# Patient Record
Sex: MALE | Race: WHITE
[De-identification: names, ages, dates, MRNs, and addresses within clinical notes are randomized per-mention and may not be internally consistent; named-entity substitution may affect disease eponyms.]

---

## 2017-07-06 ENCOUNTER — RX ONLY (RX ONLY)
Age: 82
End: 2017-07-06

## 2017-07-06 ENCOUNTER — DOCTOR'S OFFICE (OUTPATIENT)
Dept: URBAN - METROPOLITAN AREA CLINIC 136 | Facility: CLINIC | Age: 82
Setting detail: OPHTHALMOLOGY
End: 2017-07-06
Payer: COMMERCIAL

## 2017-07-06 DIAGNOSIS — H02.831: ICD-10-CM

## 2017-07-06 DIAGNOSIS — H43.811: ICD-10-CM

## 2017-07-06 DIAGNOSIS — D31.31: ICD-10-CM

## 2017-07-06 DIAGNOSIS — E11.9: ICD-10-CM

## 2017-07-06 DIAGNOSIS — H02.834: ICD-10-CM

## 2017-07-06 DIAGNOSIS — Z79.899: ICD-10-CM

## 2017-07-06 PROCEDURE — 99204 OFFICE O/P NEW MOD 45 MIN: CPT | Performed by: OPHTHALMOLOGY

## 2017-07-06 ASSESSMENT — LID POSITION - DERMATOCHALASIS
OS_DERMATOCHALASIS: 1+
OD_DERMATOCHALASIS: 1+

## 2017-07-06 ASSESSMENT — REFRACTION_MANIFEST
OS_VA2: 20/
OS_VA3: 20/
OS_VA1: 20/
OS_VA2: 20/
OD_VA3: 20/
OS_VA2: 20/
OU_VA: 20/
OS_VA1: 20/
OS_VA3: 20/
OD_VA2: 20/
OS_VA1: 20/
OD_VA1: 20/
OD_VA3: 20/
OU_VA: 20/
OD_VA1: 20/
OU_VA: 20/
OD_VA2: 20/
OS_VA3: 20/
OD_VA3: 20/
OD_VA2: 20/
OD_VA1: 20/

## 2017-07-06 ASSESSMENT — REFRACTION_CURRENTRX
OS_OVR_VA: 20/
OD_OVR_VA: 20/

## 2017-07-06 ASSESSMENT — CONFRONTATIONAL VISUAL FIELD TEST (CVF)
OD_FINDINGS: FULL
OS_FINDINGS: FULL

## 2017-07-06 ASSESSMENT — VISUAL ACUITY
OD_BCVA: 20/25-2
OS_BCVA: 20/40+2

## 2017-12-20 ENCOUNTER — ALLSCRIPTS OFFICE VISIT (OUTPATIENT)
Dept: OTHER | Facility: OTHER | Age: 82
End: 2017-12-20

## 2017-12-20 LAB
BILIRUB UR QL STRIP: NORMAL
CLARITY UR: NORMAL
COLOR UR: YELLOW
GLUCOSE (HISTORICAL): NORMAL
HGB UR QL STRIP.AUTO: NORMAL
KETONES UR STRIP-MCNC: NORMAL MG/DL
LEUKOCYTE ESTERASE UR QL STRIP: NORMAL
NITRITE UR QL STRIP: NORMAL
PH UR STRIP.AUTO: 6 [PH]
PROT UR STRIP-MCNC: NORMAL MG/DL
SP GR UR STRIP.AUTO: 1.01
UROBILINOGEN UR QL STRIP.AUTO: 0.2

## 2018-01-23 VITALS
HEIGHT: 70 IN | BODY MASS INDEX: 26.48 KG/M2 | SYSTOLIC BLOOD PRESSURE: 120 MMHG | WEIGHT: 185 LBS | DIASTOLIC BLOOD PRESSURE: 78 MMHG

## 2018-02-12 DIAGNOSIS — N32.81 OVERACTIVE BLADDER: Primary | ICD-10-CM

## 2018-02-12 RX ORDER — OXYBUTYNIN CHLORIDE 5 MG/1
TABLET ORAL
Qty: 180 TABLET | Refills: 3 | Status: SHIPPED | OUTPATIENT
Start: 2018-02-12

## 2020-05-03 ENCOUNTER — OFFICE VISIT (OUTPATIENT)
Dept: URGENT CARE | Age: 85
End: 2020-05-03
Payer: COMMERCIAL

## 2020-05-03 ENCOUNTER — APPOINTMENT (OUTPATIENT)
Dept: RADIOLOGY | Age: 85
End: 2020-05-03
Payer: COMMERCIAL

## 2020-05-03 VITALS
DIASTOLIC BLOOD PRESSURE: 69 MMHG | HEIGHT: 72 IN | WEIGHT: 180 LBS | OXYGEN SATURATION: 95 % | BODY MASS INDEX: 24.38 KG/M2 | TEMPERATURE: 98.2 F | HEART RATE: 74 BPM | SYSTOLIC BLOOD PRESSURE: 130 MMHG | RESPIRATION RATE: 18 BRPM

## 2020-05-03 DIAGNOSIS — M79.672 LEFT FOOT PAIN: ICD-10-CM

## 2020-05-03 DIAGNOSIS — S90.32XA CONTUSION OF LEFT FOOT, INITIAL ENCOUNTER: Primary | ICD-10-CM

## 2020-05-03 PROCEDURE — 73630 X-RAY EXAM OF FOOT: CPT

## 2020-05-03 PROCEDURE — 99203 OFFICE O/P NEW LOW 30 MIN: CPT | Performed by: PHYSICIAN ASSISTANT

## 2020-05-03 RX ORDER — AMLODIPINE BESYLATE 10 MG/1
TABLET ORAL
COMMUNITY
Start: 2020-01-15

## 2020-05-03 RX ORDER — SIMVASTATIN 20 MG
TABLET ORAL
COMMUNITY
Start: 2020-04-02

## 2020-05-03 RX ORDER — CHOLECALCIFEROL (VITAMIN D3) 125 MCG
1 CAPSULE ORAL
COMMUNITY

## 2020-05-03 RX ORDER — DOXYCYCLINE 100 MG/1
100 TABLET ORAL 2 TIMES DAILY
Qty: 14 TABLET | Refills: 0 | Status: SHIPPED | OUTPATIENT
Start: 2020-05-03 | End: 2020-05-10

## 2020-05-03 RX ORDER — HYDROXYCHLOROQUINE SULFATE 200 MG/1
200 TABLET, FILM COATED ORAL 2 TIMES DAILY
COMMUNITY
Start: 2014-11-04

## 2020-05-03 RX ORDER — OMEGA-3-ACID ETHYL ESTERS 1 G/1
CAPSULE, LIQUID FILLED ORAL
COMMUNITY

## 2020-05-03 RX ORDER — LISINOPRIL 40 MG/1
1 TABLET ORAL DAILY
COMMUNITY
Start: 2018-07-24

## 2020-05-03 RX ORDER — REPAGLINIDE 0.5 MG/1
TABLET ORAL
COMMUNITY
Start: 2020-04-02

## 2020-09-02 ENCOUNTER — OFFICE VISIT (OUTPATIENT)
Dept: URGENT CARE | Age: 85
End: 2020-09-02
Payer: COMMERCIAL

## 2020-09-02 VITALS
OXYGEN SATURATION: 96 % | RESPIRATION RATE: 18 BRPM | HEIGHT: 72 IN | HEART RATE: 69 BPM | SYSTOLIC BLOOD PRESSURE: 127 MMHG | WEIGHT: 180 LBS | TEMPERATURE: 98 F | BODY MASS INDEX: 24.38 KG/M2 | DIASTOLIC BLOOD PRESSURE: 60 MMHG

## 2020-09-02 DIAGNOSIS — T63.451A HORNET STING, ACCIDENTAL OR UNINTENTIONAL, INITIAL ENCOUNTER: Primary | ICD-10-CM

## 2020-09-02 PROCEDURE — 99213 OFFICE O/P EST LOW 20 MIN: CPT | Performed by: PHYSICIAN ASSISTANT

## 2020-09-02 NOTE — PROGRESS NOTES
[facility] [facility]        NAME: Candance Gone is a 81 y o  male  : 1935    MRN: [id number]  DATE: 2020  TIME: 4:16 PM    Assessment and Plan   Hornet sting, accidental or unintentional, initial encounter [T63 [id number]]  1  Hornet sting, accidental or unintentional, initial encounter           Patient Instructions     Follow up with PCP in 3-5 days  Proceed to  ER if symptoms worsen  Chief Complaint     Chief Complaint   Patient presents with    Hand Pain     Pt states that he got stung by a hornet on the left pinky finger, pt states that the pain is starting to radiate up the hand          History of Present Illness       20-year-old male presents for a hornet sting to his left 5th finger  He states that it happened approximately 3 hours ago  He is concerned because his finger is swollen and painful  He denies any itching, rash, shortness of breath, difficulty breathing, lip or tongue swelling  Patient states he has no previous history of any allergic reactions or anaphylaxis  Review of Systems   Review of Systems   Constitutional: Negative  Respiratory: Negative  Cardiovascular: Negative  Skin: Positive for color change  Hornet sting     Neurological: Negative            Current Medications       Current Outpatient Medications:     amLODIPine (NORVASC) 10 mg tablet, TAKE 1 TABLET BY MOUTH ONCE A DAY, Disp: , Rfl:     Cholecalciferol (VITAMIN D3) 50 MCG (2000 UT) TABS, 1 capsule, Disp: , Rfl:     hydroxychloroquine (PLAQUENIL) 200 mg tablet, Take 200 mg by mouth 2 (two) times a day, Disp: , Rfl:     lisinopril (ZESTRIL) 40 mg tablet, Take 1 tablet by mouth daily, Disp: , Rfl:     metFORMIN (GLUCOPHAGE) 1000 MG tablet, Take 1,000 mg by mouth 2 (two) times a day, Disp: , Rfl:     Multiple Vitamins-Minerals (CENTRUM SILVER 50+MEN PO), Take by mouth, Disp: , Rfl:     omega-3-acid ethyl esters (Lovaza) 1 g capsule, , Disp: , Rfl:     oxybutynin (DITROPAN) 5 mg tablet, TAKE 1 TABLET BY MOUTH  TWICE A DAY, Disp: 180 tablet, Rfl: 3    repaglinide (PRANDIN) 0 5 mg tablet, , Disp: , Rfl:     simvastatin (ZOCOR) 20 mg tablet, , Disp: , Rfl:     Current Allergies     Allergies as of 09/02/2020 - Reviewed 09/02/2020   Allergen Reaction Noted    Penicillins Rash 07/13/2017            The following portions of the patient's history were reviewed and updated as appropriate: allergies, current medications, past family history, past medical history, past social history, past surgical history and problem list     Objective   /60   Pulse 69   Temp 98 °F (36 7 °C)   Resp 18   Ht 6' (1 829 m)   Wt 81 6 kg (180 lb)   SpO2 96%   BMI 24 41 kg/m²        Physical Exam     Physical Exam  Vitals signs and nursing note reviewed  Constitutional:       General: He is not in acute distress  Appearance: He is not ill-appearing  Cardiovascular:      Rate and Rhythm: Normal rate and regular rhythm  Pulmonary:      Effort: Pulmonary effort is normal       Breath sounds: Normal breath sounds  Skin:         Neurological:      Mental Status: He is alert and oriented to person, place, and time

## 2020-09-02 NOTE — PATIENT INSTRUCTIONS
Hornet sting with localized reaction  Take antihistamine such as Claritin  Elevate and ice  Any worsening of symptoms such as rash, shortness of breath, lip her tongue swelling to the emergency room

## 2022-07-25 PROCEDURE — U0005 INFEC AGEN DETEC AMPLI PROBE: HCPCS | Performed by: FAMILY MEDICINE

## 2022-07-25 PROCEDURE — U0003 INFECTIOUS AGENT DETECTION BY NUCLEIC ACID (DNA OR RNA); SEVERE ACUTE RESPIRATORY SYNDROME CORONAVIRUS 2 (SARS-COV-2) (CORONAVIRUS DISEASE [COVID-19]), AMPLIFIED PROBE TECHNIQUE, MAKING USE OF HIGH THROUGHPUT TECHNOLOGIES AS DESCRIBED BY CMS-2020-01-R: HCPCS | Performed by: FAMILY MEDICINE

## 2022-07-26 ENCOUNTER — LAB REQUISITION (OUTPATIENT)
Dept: LAB | Facility: HOSPITAL | Age: 87
End: 2022-07-26
Payer: COMMERCIAL

## 2022-07-26 DIAGNOSIS — Z11.59 ENCOUNTER FOR SCREENING FOR OTHER VIRAL DISEASES: ICD-10-CM

## 2022-07-26 DIAGNOSIS — Z03.818 ENCOUNTER FOR OBSERVATION FOR SUSPECTED EXPOSURE TO OTHER BIOLOGICAL AGENTS RULED OUT: ICD-10-CM

## 2022-07-26 LAB — SARS-COV-2 RNA RESP QL NAA+PROBE: NEGATIVE

## 2022-07-29 PROCEDURE — U0005 INFEC AGEN DETEC AMPLI PROBE: HCPCS | Performed by: FAMILY MEDICINE

## 2022-07-29 PROCEDURE — U0003 INFECTIOUS AGENT DETECTION BY NUCLEIC ACID (DNA OR RNA); SEVERE ACUTE RESPIRATORY SYNDROME CORONAVIRUS 2 (SARS-COV-2) (CORONAVIRUS DISEASE [COVID-19]), AMPLIFIED PROBE TECHNIQUE, MAKING USE OF HIGH THROUGHPUT TECHNOLOGIES AS DESCRIBED BY CMS-2020-01-R: HCPCS | Performed by: FAMILY MEDICINE

## 2022-07-30 ENCOUNTER — LAB REQUISITION (OUTPATIENT)
Dept: LAB | Facility: HOSPITAL | Age: 87
End: 2022-07-30
Payer: COMMERCIAL

## 2022-07-30 DIAGNOSIS — Z11.59 ENCOUNTER FOR SCREENING FOR OTHER VIRAL DISEASES: ICD-10-CM

## 2022-07-30 DIAGNOSIS — Z03.818 ENCOUNTER FOR OBSERVATION FOR SUSPECTED EXPOSURE TO OTHER BIOLOGICAL AGENTS RULED OUT: ICD-10-CM

## 2022-07-30 LAB — SARS-COV-2 RNA RESP QL NAA+PROBE: NEGATIVE

## 2022-08-02 PROCEDURE — U0003 INFECTIOUS AGENT DETECTION BY NUCLEIC ACID (DNA OR RNA); SEVERE ACUTE RESPIRATORY SYNDROME CORONAVIRUS 2 (SARS-COV-2) (CORONAVIRUS DISEASE [COVID-19]), AMPLIFIED PROBE TECHNIQUE, MAKING USE OF HIGH THROUGHPUT TECHNOLOGIES AS DESCRIBED BY CMS-2020-01-R: HCPCS | Performed by: FAMILY MEDICINE

## 2022-08-02 PROCEDURE — U0005 INFEC AGEN DETEC AMPLI PROBE: HCPCS | Performed by: FAMILY MEDICINE

## 2022-08-04 ENCOUNTER — LAB REQUISITION (OUTPATIENT)
Dept: LAB | Facility: HOSPITAL | Age: 87
End: 2022-08-04
Payer: COMMERCIAL

## 2022-08-04 DIAGNOSIS — Z11.59 ENCOUNTER FOR SCREENING FOR OTHER VIRAL DISEASES: ICD-10-CM

## 2022-08-04 DIAGNOSIS — Z03.818 ENCOUNTER FOR OBSERVATION FOR SUSPECTED EXPOSURE TO OTHER BIOLOGICAL AGENTS RULED OUT: ICD-10-CM

## 2022-08-04 LAB — SARS-COV-2 RNA RESP QL NAA+PROBE: NEGATIVE

## 2022-08-12 PROCEDURE — U0003 INFECTIOUS AGENT DETECTION BY NUCLEIC ACID (DNA OR RNA); SEVERE ACUTE RESPIRATORY SYNDROME CORONAVIRUS 2 (SARS-COV-2) (CORONAVIRUS DISEASE [COVID-19]), AMPLIFIED PROBE TECHNIQUE, MAKING USE OF HIGH THROUGHPUT TECHNOLOGIES AS DESCRIBED BY CMS-2020-01-R: HCPCS | Performed by: FAMILY MEDICINE

## 2022-08-12 PROCEDURE — U0005 INFEC AGEN DETEC AMPLI PROBE: HCPCS | Performed by: FAMILY MEDICINE

## 2022-08-13 ENCOUNTER — LAB REQUISITION (OUTPATIENT)
Dept: LAB | Facility: HOSPITAL | Age: 87
End: 2022-08-13
Payer: COMMERCIAL

## 2022-08-13 DIAGNOSIS — Z11.59 ENCOUNTER FOR SCREENING FOR OTHER VIRAL DISEASES: ICD-10-CM

## 2022-08-13 LAB — SARS-COV-2 RNA RESP QL NAA+PROBE: NEGATIVE

## 2023-01-19 ENCOUNTER — OFFICE VISIT (OUTPATIENT)
Dept: INTERNAL MEDICINE CLINIC | Facility: OTHER | Age: 88
End: 2023-01-19

## 2023-01-19 VITALS
WEIGHT: 190 LBS | TEMPERATURE: 98.2 F | BODY MASS INDEX: 26.6 KG/M2 | HEIGHT: 71 IN | SYSTOLIC BLOOD PRESSURE: 130 MMHG | DIASTOLIC BLOOD PRESSURE: 72 MMHG | OXYGEN SATURATION: 98 % | HEART RATE: 68 BPM

## 2023-01-19 DIAGNOSIS — L50.9 HIVES: Primary | ICD-10-CM

## 2023-01-19 RX ORDER — PREDNISONE 20 MG/1
40 TABLET ORAL DAILY
Qty: 10 TABLET | Refills: 0 | Status: SHIPPED | OUTPATIENT
Start: 2023-01-19 | End: 2023-01-24

## 2023-01-19 RX ORDER — APIXABAN 2.5 MG/1
TABLET, FILM COATED ORAL
COMMUNITY
Start: 2022-12-08

## 2023-01-19 RX ORDER — PREDNISONE 1 MG/1
1 TABLET ORAL DAILY
COMMUNITY
Start: 2022-10-18 | End: 2023-01-19

## 2023-01-19 RX ORDER — EMPAGLIFLOZIN 10 MG/1
TABLET, FILM COATED ORAL
COMMUNITY
Start: 2023-01-11

## 2023-01-19 RX ORDER — LANCETS
EACH MISCELLANEOUS
COMMUNITY
Start: 2023-01-11

## 2023-01-19 RX ORDER — CHLORAL HYDRATE 500 MG
CAPSULE ORAL
COMMUNITY

## 2023-01-19 RX ORDER — MIRTAZAPINE 15 MG/1
TABLET, FILM COATED ORAL
COMMUNITY
Start: 2022-12-08

## 2023-01-19 RX ORDER — BLOOD SUGAR DIAGNOSTIC
STRIP MISCELLANEOUS
COMMUNITY
Start: 2023-01-11

## 2023-01-19 RX ORDER — MIRABEGRON 50 MG/1
50 TABLET, FILM COATED, EXTENDED RELEASE ORAL DAILY
COMMUNITY
Start: 2023-01-11

## 2023-01-19 RX ORDER — NAPROXEN SODIUM 220 MG
TABLET ORAL
COMMUNITY

## 2023-01-19 NOTE — PROGRESS NOTES
Assessment/Plan:    Hives  Will prescribe prednisone 40 mg daily for 5 days  He is to contact our office for persistent or worsening symptoms  Diagnoses and all orders for this visit:    Hives  -     predniSONE 20 mg tablet; Take 2 tablets (40 mg total) by mouth daily for 5 days    Other orders  -     Multiple Vitamins-Minerals (CENTRUM SILVER 50+MEN PO)  -     Eliquis 2 5 MG  -     Jardiance 10 MG TABS tablet  -     Accu-Chek Lois Plus test strip  -     Accu-Chek FastClix Lancets MISC  -     metoprolol tartrate (LOPRESSOR) 25 mg tablet  -     Mirabegron ER (Myrbetriq) 50 MG TB24; Take 50 mg by mouth daily  -     mirtazapine (REMERON) 15 mg tablet  -     Omega-3 Fatty Acids (fish oil) 1,000 mg  -     naproxen sodium (ALEVE) 220 MG tablet  -     Discontinue: predniSONE 5 mg tablet; Take 1 tablet by mouth daily                Subjective:      Patient ID: Jordan Rowe is a 81 y o  male  Chief Complaint   Patient presents with   • Rash     Rash All over body Tuesday he didn't feel good he drank ginger ale made him feel sick,        56-year-old male seen today with concern for diffuse rash  The rash started 2 days ago  Around the same time he developed an upset stomach however denies any nausea, vomiting, diarrhea  He denies any new medications, detergents, or any ingestion of suspicious foods  He has been drinking ginger ale for his upset stomach however denies taking any over-the-counter medications  Rash  This is a new problem  The current episode started in the past 7 days  The problem is unchanged  The rash is diffuse  Pertinent negatives include no congestion, cough, diarrhea, fatigue, fever, rhinorrhea, shortness of breath, sore throat or vomiting         The following portions of the patient's history were reviewed and updated as appropriate: allergies, current medications, past family history, past medical history, past social history, past surgical history and problem list     Review of For cholesterol - start Rosuvastatin 5mg daily. Return to clinic in 3 months - have fasting blood work/urine done 3-4 days prior to visit. Systems   Constitutional: Negative for activity change, appetite change, chills, diaphoresis, fatigue and fever  HENT: Negative for congestion, postnasal drip, rhinorrhea, sinus pressure, sinus pain, sneezing and sore throat  Eyes: Negative for visual disturbance  Respiratory: Negative for apnea, cough, choking, chest tightness, shortness of breath and wheezing  Cardiovascular: Negative for chest pain, palpitations and leg swelling  Gastrointestinal: Negative for abdominal distention, abdominal pain, anal bleeding, blood in stool, constipation, diarrhea, nausea and vomiting  Endocrine: Negative for cold intolerance and heat intolerance  Genitourinary: Negative for difficulty urinating, dysuria and hematuria  Musculoskeletal: Negative  Skin: Positive for rash  Neurological: Negative for dizziness, weakness, light-headedness, numbness and headaches  Hematological: Negative for adenopathy  Psychiatric/Behavioral: Negative for agitation, sleep disturbance and suicidal ideas  All other systems reviewed and are negative          Past Medical History:   Diagnosis Date   • Chronic kidney disease, stage 3 (HCC)     with secondary hyperparathyroidism of renal origin   • Diabetes mellitus ([facility] )    • Hyperlipidemia    • Hypertension    • Rheumatoid arthritis ([facility] )          Current Outpatient Medications:   •  Accu-Chek Lois Plus test strip, , Disp: , Rfl:   •  Accu-Chek FastClix Lancets MISC, , Disp: , Rfl:   •  amLODIPine (NORVASC) 10 mg tablet, TAKE 1 TABLET BY MOUTH ONCE A DAY, Disp: , Rfl:   •  Cholecalciferol (VITAMIN D3) 50 MCG (2000 UT) TABS, 1 capsule, Disp: , Rfl:   •  Eliquis 2 5 MG, , Disp: , Rfl:   •  hydroxychloroquine (PLAQUENIL) 200 mg tablet, Take 200 mg by mouth 2 (two) times a day, Disp: , Rfl:   •  Jardiance 10 MG TABS tablet, , Disp: , Rfl:   •  lisinopril (ZESTRIL) 40 mg tablet, Take 1 tablet by mouth daily, Disp: , Rfl:   •  metFORMIN (GLUCOPHAGE) 1000 MG tablet, Take 1,000 mg by mouth 2 (two) times a day, Disp: , Rfl:   •  metoprolol tartrate (LOPRESSOR) 25 mg tablet, , Disp: , Rfl:   •  Mirabegron ER (Myrbetriq) 50 MG TB24, Take 50 mg by mouth daily, Disp: , Rfl:   •  mirtazapine (REMERON) 15 mg tablet, , Disp: , Rfl:   •  Multiple Vitamins-Minerals (CENTRUM SILVER 50+MEN PO), , Disp: , Rfl:   •  naproxen sodium (ALEVE) 220 MG tablet, , Disp: , Rfl:   •  Omega-3 Fatty Acids (fish oil) 1,000 mg, , Disp: , Rfl:   •  omega-3-acid ethyl esters (LOVAZA) 1 g capsule, , Disp: , Rfl:   •  oxybutynin (DITROPAN) 5 mg tablet, TAKE 1 TABLET BY MOUTH  TWICE A DAY, Disp: 180 tablet, Rfl: 3  •  predniSONE 20 mg tablet, Take 2 tablets (40 mg total) by mouth daily for 5 days, Disp: 10 tablet, Rfl: 0  •  repaglinide (PRANDIN) 0 5 mg tablet, , Disp: , Rfl:   •  simvastatin (ZOCOR) 20 mg tablet, , Disp: , Rfl:     Allergies   Allergen Reactions   • Penicillins Rash       Social History   History reviewed  No pertinent surgical history  History reviewed  No pertinent family history  Objective:  /72 (BP Location: Left arm, Patient Position: Sitting, Cuff Size: Large)   Pulse 68   Temp 98 2 °F (36 8 °C) (Temporal)   Ht 5' 11" (1 803 m)   Wt 86 2 kg (190 lb)   SpO2 98%   BMI 26 50 kg/m²     No results found for this or any previous visit (from the past 1344 hour(s))  Physical Exam  Vitals and nursing note reviewed  Constitutional:       General: He is not in acute distress  Appearance: He is well-developed  He is not diaphoretic  HENT:      Head: Normocephalic and atraumatic  Eyes:      General: No scleral icterus  Right eye: No discharge  Left eye: No discharge  Conjunctiva/sclera: Conjunctivae normal       Pupils: Pupils are equal, round, and reactive to light  Neck:      Thyroid: No thyromegaly  Vascular: No JVD  Cardiovascular:      Rate and Rhythm: Normal rate and regular rhythm  Heart sounds: Normal heart sounds  No murmur heard      No friction rub  No gallop  Pulmonary:      Effort: Pulmonary effort is normal  No respiratory distress  Breath sounds: Normal breath sounds  No wheezing or rales  Chest:      Chest wall: No tenderness  Abdominal:      General: Bowel sounds are normal  There is no distension  Palpations: Abdomen is soft  There is no mass  Tenderness: There is no abdominal tenderness  There is no guarding or rebound  Musculoskeletal:         General: No tenderness or deformity  Normal range of motion  Cervical back: Normal range of motion and neck supple  Lymphadenopathy:      Cervical: No cervical adenopathy  Skin:     General: Skin is warm and dry  Coloration: Skin is not pale  Findings: No erythema or rash  Comments: Diffuse hives  Neurological:      Mental Status: He is alert and oriented to person, place, and time  Cranial Nerves: No cranial nerve deficit  Coordination: Coordination normal       Deep Tendon Reflexes: Reflexes are normal and symmetric  Psychiatric:         Behavior: Behavior normal          Thought Content:  Thought content normal          Judgment: Judgment normal

## 2023-01-19 NOTE — ASSESSMENT & PLAN NOTE
Will prescribe prednisone 40 mg daily for 5 days  He is to contact our office for persistent or worsening symptoms

## 2023-02-08 ENCOUNTER — RA CDI HCC (OUTPATIENT)
Dept: OTHER | Facility: HOSPITAL | Age: 88
End: 2023-02-08

## 2023-02-08 ENCOUNTER — OFFICE VISIT (OUTPATIENT)
Dept: INTERNAL MEDICINE CLINIC | Facility: OTHER | Age: 88
End: 2023-02-08

## 2023-02-08 VITALS
WEIGHT: 190.2 LBS | HEART RATE: 65 BPM | OXYGEN SATURATION: 96 % | HEIGHT: 71 IN | RESPIRATION RATE: 20 BRPM | TEMPERATURE: 98 F | BODY MASS INDEX: 26.63 KG/M2 | DIASTOLIC BLOOD PRESSURE: 66 MMHG | SYSTOLIC BLOOD PRESSURE: 118 MMHG

## 2023-02-08 DIAGNOSIS — I12.9 HYPERTENSION ASSOCIATED WITH STAGE 3 CHRONIC KIDNEY DISEASE DUE TO TYPE 2 DIABETES MELLITUS (HCC): ICD-10-CM

## 2023-02-08 DIAGNOSIS — M06.9 RHEUMATOID ARTHRITIS INVOLVING MULTIPLE SITES, UNSPECIFIED WHETHER RHEUMATOID FACTOR PRESENT (HCC): ICD-10-CM

## 2023-02-08 DIAGNOSIS — R97.20 HIGH PROSTATE SPECIFIC ANTIGEN (PSA): ICD-10-CM

## 2023-02-08 DIAGNOSIS — I48.91 ATRIAL FIBRILLATION, UNSPECIFIED TYPE (HCC): ICD-10-CM

## 2023-02-08 DIAGNOSIS — R80.9 PROTEINURIA, UNSPECIFIED TYPE: ICD-10-CM

## 2023-02-08 DIAGNOSIS — N25.81 SECONDARY HYPERPARATHYROIDISM OF RENAL ORIGIN (HCC): ICD-10-CM

## 2023-02-08 DIAGNOSIS — E78.2 MIXED HYPERLIPIDEMIA: ICD-10-CM

## 2023-02-08 DIAGNOSIS — N18.30 HYPERTENSION ASSOCIATED WITH STAGE 3 CHRONIC KIDNEY DISEASE DUE TO TYPE 2 DIABETES MELLITUS (HCC): ICD-10-CM

## 2023-02-08 DIAGNOSIS — E11.22 HYPERTENSION ASSOCIATED WITH STAGE 3 CHRONIC KIDNEY DISEASE DUE TO TYPE 2 DIABETES MELLITUS (HCC): ICD-10-CM

## 2023-02-08 DIAGNOSIS — L50.9 HIVES: Primary | ICD-10-CM

## 2023-02-08 DIAGNOSIS — I10 ESSENTIAL HYPERTENSION: ICD-10-CM

## 2023-02-08 DIAGNOSIS — E11.65 TYPE 2 DIABETES MELLITUS WITH HYPERGLYCEMIA, WITHOUT LONG-TERM CURRENT USE OF INSULIN (HCC): ICD-10-CM

## 2023-02-08 PROBLEM — N32.81 OVERACTIVE BLADDER: Status: ACTIVE | Noted: 2017-07-13

## 2023-02-08 PROBLEM — R69 OTHER ILL-DEFINED AND UNKNOWN CAUSES OF MORBIDITY AND MORTALITY: Status: ACTIVE | Noted: 2023-02-08

## 2023-02-08 PROBLEM — I45.4 BUNDLE BRANCH BLOCK: Status: ACTIVE | Noted: 2018-04-05

## 2023-02-08 PROBLEM — E79.0 HYPERURICEMIA: Status: ACTIVE | Noted: 2019-09-06

## 2023-02-08 PROBLEM — M85.89 OSTEOPENIA OF MULTIPLE SITES: Status: ACTIVE | Noted: 2020-10-20

## 2023-02-08 PROBLEM — M19.90 OSTEOARTHRITIS: Status: ACTIVE | Noted: 2023-02-08

## 2023-02-08 PROBLEM — F52.8 PSYCHOSEXUAL DYSFUNCTION WITH INHIBITED SEXUAL EXCITEMENT: Status: ACTIVE | Noted: 2023-02-08

## 2023-02-08 PROBLEM — H25.019 CORTICAL SENILE CATARACT: Status: ACTIVE | Noted: 2023-02-08

## 2023-02-08 PROBLEM — F41.1 GENERALIZED ANXIETY DISORDER: Status: ACTIVE | Noted: 2023-02-08

## 2023-02-08 NOTE — PROGRESS NOTES
[facility]  coding opportunities       Chart reviewed, no opportunity found:   [address]        Patients Insurance     Medicare Insurance: [employer]

## 2023-02-08 NOTE — PROGRESS NOTES
Assessment/Plan:    Type 2 diabetes mellitus (HCC)  Continue current regimen, recheck A1c  Lab Results   Component Value Date    HGBA1C 9 7 (H) 05/11/2022       Secondary hyperparathyroidism of renal origin ([facility] 75 )  Will check PTH and kidney function  Essential hypertension  Controlled, Continue current antihypertensive regimen  Hives  Discontinue mirabegron  Follow up in 2 weeks  Diagnoses and all orders for this visit:    Hives  -     C-reactive protein; Future    Hypertension associated with stage 3 chronic kidney disease due to type 2 diabetes mellitus (HCC)  -     CBC; Future  -     Comprehensive metabolic panel; Future  -     Hemoglobin A1C; Future  -     Lipid panel; Future  -     TSH, 3rd generation with Free T4 reflex; Future  -     Microalbumin / creatinine urine ratio; Future  -     UA w Reflex to Microscopic w Reflex to Culture -Lab Collect; Future    Type 2 diabetes mellitus with hyperglycemia, without long-term current use of insulin ([facility])  -     CBC; Future  -     Comprehensive metabolic panel; Future  -     Hemoglobin A1C; Future  -     Lipid panel; Future  -     Microalbumin / creatinine urine ratio; Future  -     UA w Reflex to Microscopic w Reflex to Culture -Lab Collect; Future    Rheumatoid arthritis involving multiple sites, unspecified whether rheumatoid factor present ([facility])  -     C-reactive protein; Future    High prostate specific antigen (PSA)  -     PSA Total, Diagnostic; Future    Mixed hyperlipidemia  -     CBC; Future  -     Comprehensive metabolic panel; Future  -     Lipid panel; Future    Proteinuria, unspecified type  -     CBC; Future  -     Comprehensive metabolic panel; Future  -     Microalbumin / creatinine urine ratio; Future  -     UA w Reflex to Microscopic w Reflex to Culture -Lab Collect; Future    Secondary hyperparathyroidism of renal origin ([facility] 75 )  -     CBC; Future  -     Comprehensive metabolic panel; Future  -     PTH, intact;  Future    Atrial fibrillation, unspecified type [facility])    Essential hypertension                Subjective:      Patient ID: Senthil Herndon is a 81 y o  male  Chief Complaint   Patient presents with   • Rash     Continued rash finished with the prednisone  Has it on his arms trunk back spreading now to his groin  Itches  Was seen on 1/19/23    • hm     AWV, fall risk, bmi f/u plan, phq       81year-old male seen today with concern for persistent rash  He was prescribed prednisone which he did not notice any improvement of the rash  The rash is pruritic at times  The rash remains elevated to proximal lower extremities, chest, abdomen, back, and bilateral upper arms  In revieweding his medications again, he admits to recently starting Mirabegron  Rash  This is a new problem  The current episode started more than 1 month ago  The problem is unchanged  The rash is diffuse  Pertinent negatives include no congestion, cough, diarrhea, fatigue, fever, rhinorrhea, shortness of breath, sore throat or vomiting  The following portions of the patient's history were reviewed and updated as appropriate: allergies, current medications, past family history, past medical history, past social history, past surgical history and problem list     Review of Systems   Constitutional: Negative for activity change, appetite change, chills, diaphoresis, fatigue and fever  HENT: Negative for congestion, postnasal drip, rhinorrhea, sinus pressure, sinus pain, sneezing and sore throat  Eyes: Negative for visual disturbance  Respiratory: Negative for apnea, cough, choking, chest tightness, shortness of breath and wheezing  Cardiovascular: Negative for chest pain, palpitations and leg swelling  Gastrointestinal: Negative for abdominal distention, abdominal pain, anal bleeding, blood in stool, constipation, diarrhea, nausea and vomiting  Endocrine: Negative for cold intolerance and heat intolerance     Genitourinary: Negative for difficulty urinating, dysuria and hematuria  Musculoskeletal: Negative  Skin: Positive for rash  Neurological: Negative for dizziness, weakness, light-headedness, numbness and headaches  Hematological: Negative for adenopathy  Psychiatric/Behavioral: Negative for agitation, sleep disturbance and suicidal ideas  All other systems reviewed and are negative          Past Medical History:   Diagnosis Date   • Chronic kidney disease, stage 3 (HCC)     with secondary hyperparathyroidism of renal origin   • Diabetes mellitus ([facility] )    • Hyperlipidemia    • Hypertension    • Rheumatoid arthritis ([facility] )          Current Outpatient Medications:   •  Accu-Chek Lois Plus test strip, , Disp: , Rfl:   •  Accu-Chek FastClix Lancets MISC, , Disp: , Rfl:   •  amLODIPine (NORVASC) 10 mg tablet, TAKE 1 TABLET BY MOUTH ONCE A DAY, Disp: , Rfl:   •  Cholecalciferol (VITAMIN D3) 50 MCG (2000 UT) TABS, 1 capsule, Disp: , Rfl:   •  Eliquis 2 5 MG, , Disp: , Rfl:   •  hydroxychloroquine (PLAQUENIL) 200 mg tablet, Take 200 mg by mouth 2 (two) times a day, Disp: , Rfl:   •  Jardiance 10 MG TABS tablet, , Disp: , Rfl:   •  lisinopril (ZESTRIL) 40 mg tablet, Take 1 tablet by mouth daily, Disp: , Rfl:   •  metFORMIN (GLUCOPHAGE) 1000 MG tablet, Take 1,000 mg by mouth 2 (two) times a day, Disp: , Rfl:   •  metoprolol tartrate (LOPRESSOR) 25 mg tablet, , Disp: , Rfl:   •  mirtazapine (REMERON) 15 mg tablet, , Disp: , Rfl:   •  Multiple Vitamins-Minerals (CENTRUM SILVER 50+MEN PO), , Disp: , Rfl:   •  naproxen sodium (ALEVE) 220 MG tablet, , Disp: , Rfl:   •  Omega-3 Fatty Acids (fish oil) 1,000 mg, , Disp: , Rfl:   •  omega-3-acid ethyl esters (LOVAZA) 1 g capsule, , Disp: , Rfl:   •  oxybutynin (DITROPAN) 5 mg tablet, TAKE 1 TABLET BY MOUTH  TWICE A DAY, Disp: 180 tablet, Rfl: 3  •  repaglinide (PRANDIN) 0 5 mg tablet, , Disp: , Rfl:   •  simvastatin (ZOCOR) 20 mg tablet, , Disp: , Rfl:     Allergies   Allergen Reactions   • Penicillins Rash       Social History   History reviewed  No pertinent surgical history  History reviewed  No pertinent family history  Objective:  /66 (BP Location: Left arm, Patient Position: Sitting, Cuff Size: Standard)   Pulse 65   Temp 98 °F (36 7 °C) (Temporal)   Resp 20   Ht 5' 11" (1 803 m)   Wt 86 3 kg (190 lb 3 2 oz)   SpO2 96%   BMI 26 53 kg/m²     No results found for this or any previous visit (from the past 1344 hour(s))  Physical Exam  Vitals and nursing note reviewed  Constitutional:       General: He is not in acute distress  Appearance: He is well-developed  He is not diaphoretic  HENT:      Head: Normocephalic and atraumatic  Eyes:      General: No scleral icterus  Right eye: No discharge  Left eye: No discharge  Conjunctiva/sclera: Conjunctivae normal       Pupils: Pupils are equal, round, and reactive to light  Neck:      Thyroid: No thyromegaly  Vascular: No JVD  Cardiovascular:      Rate and Rhythm: Normal rate and regular rhythm  Heart sounds: Normal heart sounds  No murmur heard  No friction rub  No gallop  Pulmonary:      Effort: Pulmonary effort is normal  No respiratory distress  Breath sounds: Normal breath sounds  No wheezing or rales  Chest:      Chest wall: No tenderness  Abdominal:      General: Bowel sounds are normal  There is no distension  Palpations: Abdomen is soft  There is no mass  Tenderness: There is no abdominal tenderness  There is no guarding or rebound  Musculoskeletal:         General: No tenderness or deformity  Normal range of motion  Cervical back: Normal range of motion and neck supple  Lymphadenopathy:      Cervical: No cervical adenopathy  Skin:     General: Skin is warm and dry  Coloration: Skin is not pale  Findings: Rash present  No erythema  Comments: Diffuse rash to the bilateral arms, chest, abdomen, back, and proximal legs  Neurological:      Mental Status: He is alert and oriented to person, place, and time  Cranial Nerves: No cranial nerve deficit  Coordination: Coordination normal       Deep Tendon Reflexes: Reflexes are normal and symmetric  Psychiatric:         Behavior: Behavior normal          Thought Content:  Thought content normal          Judgment: Judgment normal

## 2023-02-09 ENCOUNTER — APPOINTMENT (OUTPATIENT)
Dept: LAB | Facility: IMAGING CENTER | Age: 88
End: 2023-02-09

## 2023-02-09 DIAGNOSIS — L50.9 HIVES: ICD-10-CM

## 2023-02-09 DIAGNOSIS — E11.65 TYPE 2 DIABETES MELLITUS WITH HYPERGLYCEMIA, WITHOUT LONG-TERM CURRENT USE OF INSULIN (HCC): ICD-10-CM

## 2023-02-09 DIAGNOSIS — N18.30 HYPERTENSION ASSOCIATED WITH STAGE 3 CHRONIC KIDNEY DISEASE DUE TO TYPE 2 DIABETES MELLITUS (HCC): ICD-10-CM

## 2023-02-09 DIAGNOSIS — E78.2 MIXED HYPERLIPIDEMIA: ICD-10-CM

## 2023-02-09 DIAGNOSIS — I12.9 HYPERTENSION ASSOCIATED WITH STAGE 3 CHRONIC KIDNEY DISEASE DUE TO TYPE 2 DIABETES MELLITUS (HCC): ICD-10-CM

## 2023-02-09 DIAGNOSIS — R80.9 PROTEINURIA, UNSPECIFIED TYPE: ICD-10-CM

## 2023-02-09 DIAGNOSIS — M06.9 RHEUMATOID ARTHRITIS INVOLVING MULTIPLE SITES, UNSPECIFIED WHETHER RHEUMATOID FACTOR PRESENT (HCC): ICD-10-CM

## 2023-02-09 DIAGNOSIS — E11.22 HYPERTENSION ASSOCIATED WITH STAGE 3 CHRONIC KIDNEY DISEASE DUE TO TYPE 2 DIABETES MELLITUS (HCC): ICD-10-CM

## 2023-02-09 DIAGNOSIS — R97.20 HIGH PROSTATE SPECIFIC ANTIGEN (PSA): ICD-10-CM

## 2023-02-09 DIAGNOSIS — N25.81 SECONDARY HYPERPARATHYROIDISM OF RENAL ORIGIN (HCC): ICD-10-CM

## 2023-02-09 LAB
ALBUMIN SERPL BCP-MCNC: 4.1 G/DL (ref 3.5–5)
ALP SERPL-CCNC: 71 U/L (ref 46–116)
ALT SERPL W P-5'-P-CCNC: 24 U/L (ref 12–78)
ANION GAP SERPL CALCULATED.3IONS-SCNC: 5 MMOL/L (ref 4–13)
AST SERPL W P-5'-P-CCNC: 16 U/L (ref 5–45)
BACTERIA UR QL AUTO: ABNORMAL /HPF
BILIRUB SERPL-MCNC: 0.55 MG/DL (ref 0.2–1)
BILIRUB UR QL STRIP: NEGATIVE
BUN SERPL-MCNC: 26 MG/DL (ref 5–25)
CALCIUM SERPL-MCNC: 9.5 MG/DL (ref 8.3–10.1)
CHLORIDE SERPL-SCNC: 110 MMOL/L (ref 96–108)
CHOLEST SERPL-MCNC: 122 MG/DL
CLARITY UR: CLEAR
CO2 SERPL-SCNC: 23 MMOL/L (ref 21–32)
COLOR UR: ABNORMAL
CREAT SERPL-MCNC: 2 MG/DL (ref 0.6–1.3)
CREAT UR-MCNC: 76 MG/DL
CRP SERPL QL: 6.1 MG/L
ERYTHROCYTE [DISTWIDTH] IN BLOOD BY AUTOMATED COUNT: 13.4 % (ref 11.6–15.1)
GFR SERPL CREATININE-BSD FRML MDRD: 29 ML/MIN/1.73SQ M
GLUCOSE P FAST SERPL-MCNC: 162 MG/DL (ref 65–99)
GLUCOSE UR STRIP-MCNC: ABNORMAL MG/DL
HCT VFR BLD AUTO: 44.6 % (ref 36.5–49.3)
HDLC SERPL-MCNC: 38 MG/DL
HGB BLD-MCNC: 14.7 G/DL (ref 12–17)
HGB UR QL STRIP.AUTO: NEGATIVE
KETONES UR STRIP-MCNC: NEGATIVE MG/DL
LDLC SERPL CALC-MCNC: 34 MG/DL (ref 0–100)
LEUKOCYTE ESTERASE UR QL STRIP: ABNORMAL
MCH RBC QN AUTO: 31.6 PG (ref 26.8–34.3)
MCHC RBC AUTO-ENTMCNC: 33 G/DL (ref 31.4–37.4)
MCV RBC AUTO: 96 FL (ref 82–98)
MICROALBUMIN UR-MCNC: 64.8 MG/L (ref 0–20)
MICROALBUMIN/CREAT 24H UR: 85 MG/G CREATININE (ref 0–30)
NITRITE UR QL STRIP: POSITIVE
NON-SQ EPI CELLS URNS QL MICRO: ABNORMAL /HPF
NONHDLC SERPL-MCNC: 84 MG/DL
PH UR STRIP.AUTO: 6 [PH]
PLATELET # BLD AUTO: 222 THOUSANDS/UL (ref 149–390)
PMV BLD AUTO: 9.9 FL (ref 8.9–12.7)
POTASSIUM SERPL-SCNC: 4.3 MMOL/L (ref 3.5–5.3)
PROT SERPL-MCNC: 7.3 G/DL (ref 6.4–8.4)
PROT UR STRIP-MCNC: ABNORMAL MG/DL
PSA SERPL-MCNC: 2.6 NG/ML (ref 0–4)
PTH-INTACT SERPL-MCNC: 33.6 PG/ML (ref 18.4–80.1)
RBC # BLD AUTO: 4.65 MILLION/UL (ref 3.88–5.62)
RBC #/AREA URNS AUTO: ABNORMAL /HPF
SODIUM SERPL-SCNC: 138 MMOL/L (ref 135–147)
SP GR UR STRIP.AUTO: 1.02 (ref 1–1.03)
TRIGL SERPL-MCNC: 252 MG/DL
TSH SERPL DL<=0.05 MIU/L-ACNC: 3.92 UIU/ML (ref 0.45–4.5)
UROBILINOGEN UR STRIP-ACNC: <2 MG/DL
WBC # BLD AUTO: 7.08 THOUSAND/UL (ref 4.31–10.16)
WBC #/AREA URNS AUTO: ABNORMAL /HPF

## 2023-02-10 LAB
EST. AVERAGE GLUCOSE BLD GHB EST-MCNC: 197 MG/DL
HBA1C MFR BLD: 8.5 %

## 2023-02-11 LAB — BACTERIA UR CULT: ABNORMAL

## 2023-02-16 DIAGNOSIS — N30.00 ACUTE CYSTITIS WITHOUT HEMATURIA: Primary | ICD-10-CM

## 2023-02-16 RX ORDER — CIPROFLOXACIN 500 MG/1
500 TABLET, FILM COATED ORAL EVERY 12 HOURS SCHEDULED
Qty: 14 TABLET | Refills: 0 | Status: SHIPPED | OUTPATIENT
Start: 2023-02-16 | End: 2023-02-22

## 2023-02-20 ENCOUNTER — TELEPHONE (OUTPATIENT)
Dept: INTERNAL MEDICINE CLINIC | Facility: OTHER | Age: 88
End: 2023-02-20

## 2023-02-20 DIAGNOSIS — L50.9 HIVES: Primary | ICD-10-CM

## 2023-02-20 RX ORDER — PREDNISONE 20 MG/1
TABLET ORAL
Qty: 15 TABLET | Refills: 0 | Status: SHIPPED | OUTPATIENT
Start: 2023-02-20 | End: 2023-03-04

## 2023-02-20 NOTE — TELEPHONE ENCOUNTER
The medication that you gave him is not helping and was told that his rash was due to Covid  But pt has never had Covid  "Still itching like crazy  "

## 2023-02-22 ENCOUNTER — OFFICE VISIT (OUTPATIENT)
Dept: INTERNAL MEDICINE CLINIC | Facility: OTHER | Age: 88
End: 2023-02-22

## 2023-02-22 VITALS
BODY MASS INDEX: 27.44 KG/M2 | HEIGHT: 71 IN | WEIGHT: 196 LBS | TEMPERATURE: 98.2 F | OXYGEN SATURATION: 98 % | HEART RATE: 65 BPM | SYSTOLIC BLOOD PRESSURE: 130 MMHG | DIASTOLIC BLOOD PRESSURE: 60 MMHG

## 2023-02-22 DIAGNOSIS — L50.9 HIVES: Primary | ICD-10-CM

## 2023-02-22 DIAGNOSIS — N18.4 CHRONIC RENAL DISEASE, STAGE IV (HCC): ICD-10-CM

## 2023-02-22 DIAGNOSIS — N18.32 STAGE 3B CHRONIC KIDNEY DISEASE (HCC): ICD-10-CM

## 2023-02-22 DIAGNOSIS — N17.9 AKI (ACUTE KIDNEY INJURY) (HCC): ICD-10-CM

## 2023-02-22 NOTE — PATIENT INSTRUCTIONS
Type 2 Diabetes Management for Adults   AMBULATORY CARE:   Type 2 diabetes  is a disease that affects how your body uses glucose (sugar)  Either your body cannot make enough insulin, or it cannot use the insulin correctly  It is important to keep diabetes controlled to prevent damage to your heart, blood vessels, and other organs  Management will help you feel well and enjoy your daily activities  Your diabetes care team providers can help you make a plan to fit diabetes care into your schedule  Your plan can change over time to fit your needs and your family's needs  Have someone call your local emergency number (911 in the [address]) if:   • You cannot be woken  • You have signs of diabetic ketoacidosis:     ? confusion, fatigue    ? vomiting    ? rapid heartbeat    ? fruity smelling breath    ? extreme thirst    ? dry mouth and skin    • You have any of the following signs of a heart attack:      ? Squeezing, pressure, or pain in your chest    ? You may  also have any of the following:     - Discomfort or pain in your back, neck, jaw, stomach, or arm    - Shortness of breath    - Nausea or vomiting    - Lightheadedness or a sudden cold sweat    • You have any of the following signs of a stroke:      ? Numbness or drooping on one side of your face     ? Weakness in an arm or leg    ? Confusion or difficulty speaking    ? Dizziness, a severe headache, or vision loss    Call your doctor or diabetes care team provider if:   • You have a sore or wound that will not heal     • You have a change in the amount you urinate  • Your blood sugar levels are higher than your target goals  • You often have lower blood sugar levels than your target goals  • Your skin is red, dry, warm, or swollen  • You have trouble coping with diabetes, or you feel anxious or depressed  • You have questions or concerns about your condition or care      What you need to know about high blood sugar levels:  High blood sugar levels may not cause any symptoms  You may feel more thirsty or urinate more often than usual  Over time, high blood sugar levels can damage your nerves, blood vessels, tissues, and organs  The following can increase your blood sugar levels:  • Large meals or large amounts of carbohydrates at one time    • Less physical activity    • Stress    • Illness    • A lower dose of diabetes medicine or insulin, or a late dose    What you need to know about low blood sugar levels:  Symptoms include feeling shaky, dizzy, irritable, or confused  You can prevent symptoms by keeping your blood sugar levels from going too low  • Treat a low blood sugar level right away:      ? Drink 4 ounces of juice or have 1 tube of glucose gel  ? Check your blood sugar level again 10 to 15 minutes later  ? When the level goes back to normal, eat a meal or snack to prevent another decrease  • Keep glucose gel, raisins, or hard candy with you at all times to treat a low blood sugar level  • Your blood sugar level can get too low if you take diabetes medicine or insulin and do not eat enough food  • If you use insulin, check your blood sugar level before you exercise  ? If your blood sugar level is below 100 mg/dL, eat 4 crackers or 2 ounces of raisins, or drink 4 ounces of juice  ? Check your level every 30 minutes if you exercise longer than 1 hour  ? You may need a snack during or after exercise  What you can do to manage your blood sugar levels:   • Check your blood sugar levels as directed and as needed  Several items are available to use to check your levels  You may need to check by testing a drop of blood in a glucose monitor  You may instead be given a continuous glucose monitoring (CGM) device  The device is worn at all times  The CGM checks your blood sugar level every 5 minutes  It sends results to an electronic device such as a smart phone  A CGM can be used with or without an insulin pump   You and your diabetes care team providers will decide on the best method for you  The goal for blood sugar levels before meals  is between 80 and 130 mg/dL and 2 hours after eating  is lower than 180 mg/dL  • Make healthy food choices  Work with a dietitian to develop a meal plan that works for you and your schedule  A dietitian can help you learn how to eat the right amount of carbohydrates during your meals and snacks  Carbohydrates can raise your blood sugar level if you eat too many at one time  Examples of foods that contain carbohydrates are breads, cereals, rice, pasta, and sweets  • Eat high-fiber foods as directed  Fiber helps improve blood sugar levels  Fiber also lowers your risk for heart disease and other problems diabetes can cause  Examples of high-fiber foods include vegetables, whole-grain bread, and beans such as jurado beans  Your dietitian can tell you how much fiber to have each day  • Get regular physical activity  Physical activity can help you get to your target blood sugar level goal and manage your weight  Get at least 150 minutes of moderate to vigorous aerobic physical activity each week  Do not miss more than 2 days in a row  Do not sit longer than 30 minutes at a time  Your healthcare provider can help you create an activity plan  The plan can include the best activities for you and can help you build your strength and endurance  • Maintain a healthy weight  Ask your team what a healthy weight is for you  A healthy weight can help you control diabetes and prevent heart disease  Ask your team to help you create a weight loss plan, if needed  Weight loss can help make a difference in managing diabetes  Your team will help you set a weight-loss goal, such as 10 to 15 pounds, or 5% of your extra weight  Together you and your team can set manageable weight loss goals  • Take your diabetes medicine or insulin as directed    You may need diabetes medicine, insulin, or both to help control your blood sugar levels  Your healthcare provider will teach you how and when to take your diabetes medicine or insulin  You will also be taught about side effects oral diabetes medicine can cause  Insulin may be injected or given through a pump or pen  You and your providers will decide on the best method for you:    ? An insulin pump  is an implanted device that gives your insulin 24 hours a day  An insulin pump prevents the need for multiple insulin injections in a day  ? An insulin pen  is a device prefilled with the right amount of insulin  ? You and your family members will be taught how to draw up and give insulin  if this is the best method for you  Your providers will also teach you how to dispose of needles and syringes  ? You will learn how much insulin you need  and when to give it  You will be taught when not to give insulin  You will also be taught what to do if your blood sugar level drops too low  This may happen if you take insulin and do not eat the right amount of carbohydrates  More ways to manage type 2 diabetes:   • Wear medical alert identification  Wear medical alert jewelry or carry a card that says you have diabetes  Ask your provider where to get these items  • Do not smoke  Nicotine and other chemicals in cigarettes and cigars can cause lung and blood vessel damage  It also makes it more difficult to manage your diabetes  Ask your provider for information if you currently smoke and need help to quit  Do not use e-cigarettes or smokeless tobacco in place of cigarettes or to help you quit  They still contain nicotine  • Check your feet each day for cuts, scratches, calluses, or other wounds  Look for redness and swelling, and feel for warmth  Wear shoes that fit well  Check your shoes for rocks or other objects that can hurt your feet  Do not walk barefoot or wear shoes without socks   Wear cotton socks to help keep your feet dry  • Ask about vaccines you may need  You have a higher risk for serious illness if you get the flu, pneumonia, COVID-19, or hepatitis  Ask your provider if you should get vaccines to prevent these or other diseases, and when to get the vaccines  • Talk to your provider if you become stressed about diabetes care  Sometimes being able to fit diabetes care into your life can cause increased stress  The stress can cause you not to take care of yourself properly  Your care team providers can help by offering tips about self-care  Your providers may suggest you talk to a mental health provider who can listen and offer help with self-care issues  • Have your A1c checked as directed  Your provider may check your A1c every 3 months, or 2 times each year if your diabetes is controlled  An A1c test shows the average amount of sugar in your blood over the past 2 to 3 months  Your provider will tell you what your A1c level should be  • Have screening tests as directed  Your provider may recommend screening for complications of diabetes and other conditions that may develop  Some screenings may begin right away and some may happen within the first 5 years of diagnosis:    ? Examples of diabetes complications  include kidney problems, high cholesterol, high blood pressure, blood vessel problems, eye problems, and sleep apnea  ? You may be screened for a low vitamin B level  if you take oral diabetes medicine for a long time  ? Women of childbearing years may be screened  for polycystic ovarian syndrome (PCOS)  Follow up with your doctor or diabetes care team providers as directed: You may need to have blood tests done before your follow-up visit  The test results will show if changes need to be made in your treatment or self-care  Talk to your provider if you cannot afford your medicine  Write down your questions so you remember to ask them during your visits    © Copyright [employer] 2022 Information is for End User's use only and may not be sold, redistributed or otherwise used for commercial purposes  The above information is an  only  It is not intended as medical advice for individual conditions or treatments  Talk to your doctor, nurse or pharmacist before following any medical regimen to see if it is safe and effective for you  Type 2 Diabetes in Adults: New Diagnosis   AMBULATORY CARE:   Type 2 diabetes  is a disease that affects how your body uses glucose (sugar)  Normally, when the blood sugar level increases, the pancreas makes more insulin  Insulin helps move sugar out of the blood so it can be used for energy  Type 2 diabetes develops because either the body cannot make enough insulin, or it cannot use the insulin correctly  Type 2 diabetes can be controlled to prevent damage to your heart, blood vessels, and other organs  Common symptoms include the following:   • Numbness in your fingers or toes    • Blurred vision    • Urinating often    • More hunger or thirst than usual    Have someone call your local emergency number (911 in the [address] [facility]) if:   • You have any of the following signs of a stroke:      ? Numbness or drooping on one side of your face     ? Weakness in an arm or leg    ? Confusion or difficulty speaking    ? Dizziness, a severe headache, or vision loss    • You have any of the following signs of a heart attack:      ? Squeezing, pressure, or pain in your chest    ? You may  also have any of the following:     - Discomfort or pain in your back, neck, jaw, stomach, or arm    - Shortness of breath    - Nausea or vomiting    - Lightheadedness or a sudden cold sweat    • You have trouble breathing  Seek care immediately if:   • You have severe abdominal pain  • You vomit for more than 2 hours  • You have trouble staying awake or focusing  • You are shaking or sweating      • You feel weak or more tired than usual     • You have blurred or double vision  • Your breath has a fruity, sweet smell  Call your doctor or diabetes care team provider if:   • Your arms and legs are swollen  • You have an upset stomach and cannot eat the foods on your meal plan  • You feel dizzy, have headaches, or are easily irritated  • Your skin is red, warm, dry, or swollen  • You have a wound that does not heal     • You have numbness in your arms or legs  • You have trouble coping with your illness, or you feel anxious or depressed  • You have questions or concerns about your condition or care  Treatment for type 2 diabetes  helps prevent or delay complications, including heart and kidney disease  You must eat healthy meals and do regular physical activity  You may  need any of the following:  • Diabetes medicines or insulin  may be given to decrease the amount of sugar in your blood  • Blood pressure medicine  may be given to lower your blood pressure  • Cholesterol-lowering medicine  may be given to prevent heart disease  • Antiplatelets , such as aspirin, help prevent blood clots  Take your antiplatelet medicine exactly as directed  These medicines make it more likely for you to bleed or bruise  If you are told to take aspirin, do not take acetaminophen or ibuprofen instead  • Take your medicine as directed  Contact your healthcare provider if you think your medicine is not helping or if you have side effects  Tell your provider if you are allergic to any medicine  Keep a list of the medicines, vitamins, and herbs you take  Include the amounts, and when and why you take them  Bring the list or the pill bottles to follow-up visits  Carry your medicine list with you in case of an emergency  Tests  may be used to check for type 2 diabetes starting at age 28   Any of the following may be used to diagnose diabetes or check that it is well controlled:  • An A1c test  shows the average amount of sugar in your blood over the past 2 to 3 months  Your diabetes care team provider will tell you the A1c level that is right for you  • A fasting plasma glucose test  is when your blood sugar level is tested after you have not eaten for 8 hours  • A 2-hour plasma glucose test  starts with a blood sugar level check after you have not eaten for 8 hours  You are then given a glucose drink  Your blood sugar level is checked after 2 hours  • A random glucose test  may be done any time of day, no matter how long ago you ate  Diabetes education:  Diabetes education will start right away  Diabetes education may also happen later to refresh your memory  Your diabetes care team may include physicians, nurse practitioners, and physician assistants  It may also include nurses, dietitians, exercise specialists, pharmacists, dentists, and podiatrists  Family members, or others who are close to you, may also be part of the team  You and your team will make goals and plans to manage diabetes and other health problems  The plans and goals will be specific to your needs  Members of your diabetes care team will teach you the following:  • About nutrition:  A dietitian will help you make a meal plan to keep your blood sugar level steady  You will learn how food affects your blood sugar levels  You will also learn to keep track of sugar and starchy foods (carbohydrates)  Do not skip meals  Your blood sugar level may drop too low if you have taken diabetes medicine and do not eat  You may be taught to use the plate method for portion control  With the plate method, ½ of your plate contains vegetables  The other half is divided so that ¼ contains protein or meat, and ¼ contains starches, such as potatoes  • About physical activity and diabetes: You will learn why physical activity, such as walking, is important  You and your care team provider will make a plan for your activity  Your care team provider will tell you what a healthy weight is for you   He or she will help you make a plan to get to that weight and stay there  Maintain a healthy weight to help delay or prevent complications of diabetes  • About your blood sugar level: You will learn what your blood sugar level should be  You will be given information on when and how to check your blood sugar level  You may need to check by testing a drop of blood in a glucose monitor  You may instead be given a continuous glucose monitoring (CGM) device  A sensor is placed in your abdomen or on your arm  You put a transmitter on the sensor to get a reading that shows up on the monitor  You will learn what to do if your level is too high or too low  Write down the times of your checks and your levels  Take them to all follow-up appointments  • About diabetes medicine: You may need oral diabetes medicine, insulin, or both to help control your blood sugar levels  Your healthcare provider will teach you how and when to take oral diabetes medicine  You will also be taught about side effects oral diabetes medicine can cause  Insulin may be added if oral diabetes medicine becomes less effective over time  Insulin may be injected, or given through a pump or pen  You and your care team will discuss which method is best for you  ? An insulin pump  is an implanted device that gives your insulin 24 hours a day  An insulin pump prevents the need for multiple insulin injections in a day  ? An insulin pen  is a device prefilled with the right amount of insulin  ? You and your family members will be taught how to draw up and give insulin  if this is the best method for you  Your education team will also teach you how to dispose of needles and syringes  ? You will learn how much insulin you need  and when to give it  You will be taught when not to give insulin  You will also be taught what to do if your blood sugar level drops too low   This may happen if you take insulin and do not eat the right amount of carbohydrates  Other ways to help manage type 2 diabetes:   • Talk to your care team if you have increased stress about your diagnosis  Stress about your diagnosis can keep you from taking care of yourself properly  Your care team can help by offering tips about self-care  Your care team may suggest you talk to a mental health provider  The provider can listen and offer help with self-care issues  • Check your feet each day for sores  Wear shoes and socks that fit correctly  Do not trim your toenails  Go to a podiatrist  Ask your care team for more information about foot care  • Do not smoke  Nicotine and other chemicals in cigarettes and cigars can cause lung damage and make diabetes harder to manage  Ask your care team provider for information if you currently smoke and need help to quit  E-cigarettes or smokeless tobacco still contain nicotine  Talk to your care team provider before you use these products  • Drink water instead of sugary drinks such as soft drinks and fruit juices  Sugary drinks increase your blood sugar level and weight  Your healthcare provider may tell you that diet drinks do not help with weight loss  • Know the risks if you choose to drink alcohol  Alcohol can cause your blood sugar levels to be low if you use insulin  Alcohol can cause high blood sugar levels and weight gain if you drink too much  A drink of alcohol is 12 ounces of beer, 5 ounces of wine, or 1½ ounces of liquor  Your care team can tell you how many drinks are okay to have in 24 hours and in 1 week  • Check your blood pressure as directed  If you have high blood pressure (BP), talk to your care team about your BP goals  Together you can create a plan to lower your BP if needed and keep it in a healthy range  The plan may include lifestyle changes or medicines  A normal BP is 119/79 or lower  A normal blood pressure can help prevent or delay certain complications from diabetes   Examples include retinopathy (eye damage) and kidney damage  • Wear medical alert identification  Wear medical alert jewelry or carry a card that says you have diabetes  Ask your care team provider where to get these items  • Ask about vaccines you may need  You have a higher risk for serious illness if you get the flu, pneumonia, COVID-19, or hepatitis  Ask your provider if you should get vaccines to prevent these or other diseases, and when to get the vaccines  Risks of type 2 diabetes: It is important to learn to keep your diabetes in control  Uncontrolled diabetes can damage your nerves, veins, and arteries  Your risk for dementia increases faster the longer your diabetes is not controlled  High blood sugar levels may damage other body tissues and organs over time  Damage to arteries may increase your risk for heart attack and stroke  Nerve damage may also lead to other heart, stomach, and nerve problems  Diabetes can become life-threatening if it is not controlled  Follow up with your care team providers as directed: You may need to return to have your A1c checked every 3 months  You will need to have your feet checked during at least 1 visit each year  You will need an eye exam 1 time each year to check for retinopathy  You will also need tests to check for kidney or heart disease, and high blood pressure  Write down your questions so you remember to ask them during your visits  © Copyright [facility] 2022 Information is for End User's use only and may not be sold, redistributed or otherwise used for commercial purposes  The above information is an  only  It is not intended as medical advice for individual conditions or treatments  Talk to your doctor, nurse or pharmacist before following any medical regimen to see if it is safe and effective for you

## 2023-02-22 NOTE — PROGRESS NOTES
Assessment/Plan:    Hives  Continue prednisone with taper and continue to hold Myrbetriq  Follow-up with PCP, next appointment is next week  REAGAN (acute kidney injury) [facility])  He has upcoming appointments with his family doctor and nephrology  Diagnoses and all orders for this visit:    Hives    REAGAN (acute kidney injury) ([facility] [address] )  -     Cancel: Basic metabolic panel; Future    Stage 3b chronic kidney disease ([facility] [address] )  -     Cancel: Basic metabolic panel; Future    Chronic renal disease, stage IV (HCC)        BMI Counseling: Body mass index is 27 34 kg/m²  The BMI is above normal  Nutrition recommendations include decreasing portion sizes, encouraging healthy choices of fruits and vegetables, decreasing fast food intake, consuming healthier snacks, limiting drinks that contain sugar, moderation in carbohydrate intake, increasing intake of lean protein, reducing intake of saturated and trans fat and reducing intake of cholesterol  Exercise recommendations include moderate physical activity 150 minutes/week and exercising 3-5 times per week  No pharmacotherapy was ordered  Patient referred to PCP  Rationale for BMI follow-up plan is due to patient being overweight or obese  Subjective:      Patient ID: Sam King is a 81 y o  male  Chief Complaint   Patient presents with   • Establish Care      establish care -needs AWV   • HM     DB FOOT EXAM, BMI , AWV DUE NEXT VISIT, DM EYE EXAM- pt use to go to [address] [facility] for eye exam, he switched to [address] [place] [address] pt had cataract surgery with him         61-year-old male seen today for follow-up of rash/hives  He has noticed some improvement with discontinuing Myrbetriq and starting prednisone taper        The following portions of the patient's history were reviewed and updated as appropriate: allergies, current medications, past family history, past medical history, past social history, past surgical history and problem list     Review of Systems Constitutional: Negative for activity change, appetite change, chills, diaphoresis, fatigue and fever  HENT: Negative for congestion, postnasal drip, rhinorrhea, sinus pressure, sinus pain, sneezing and sore throat  Eyes: Negative for visual disturbance  Respiratory: Negative for apnea, cough, choking, chest tightness, shortness of breath and wheezing  Cardiovascular: Negative for chest pain, palpitations and leg swelling  Gastrointestinal: Negative for abdominal distention, abdominal pain, anal bleeding, blood in stool, constipation, diarrhea, nausea and vomiting  Endocrine: Negative for cold intolerance and heat intolerance  Genitourinary: Negative for difficulty urinating, dysuria and hematuria  Musculoskeletal: Negative  Skin: Positive for rash  Neurological: Negative for dizziness, weakness, light-headedness, numbness and headaches  Hematological: Negative for adenopathy  Psychiatric/Behavioral: Negative for agitation, sleep disturbance and suicidal ideas  All other systems reviewed and are negative          Past Medical History:   Diagnosis Date   • Chronic kidney disease, stage 3 (HCC)     with secondary hyperparathyroidism of renal origin   • Diabetes mellitus ([facility] )    • Hyperlipidemia    • Hypertension    • Rheumatoid arthritis ([facility] )          Current Outpatient Medications:   •  Accu-Chek Lois Plus test strip, , Disp: , Rfl:   •  Accu-Chek FastClix Lancets MISC, , Disp: , Rfl:   •  amLODIPine (NORVASC) 10 mg tablet, TAKE 1 TABLET BY MOUTH ONCE A DAY, Disp: , Rfl:   •  Cholecalciferol (VITAMIN D3) 50 MCG (2000 UT) TABS, 1 capsule, Disp: , Rfl:   •  Eliquis 2 5 MG, , Disp: , Rfl:   •  hydroxychloroquine (PLAQUENIL) 200 mg tablet, Take 200 mg by mouth 2 (two) times a day, Disp: , Rfl:   •  Jardiance 10 MG TABS tablet, , Disp: , Rfl:   •  lisinopril (ZESTRIL) 40 mg tablet, Take 1 tablet by mouth daily, Disp: , Rfl:   •  metoprolol tartrate (LOPRESSOR) 25 mg tablet, , Disp: , Rfl: •  mirtazapine (REMERON) 15 mg tablet, , Disp: , Rfl:   •  Multiple Vitamins-Minerals (CENTRUM SILVER 50+MEN PO), , Disp: , Rfl:   •  naproxen sodium (ALEVE) 220 MG tablet, , Disp: , Rfl:   •  Omega-3 Fatty Acids (fish oil) 1,000 mg, , Disp: , Rfl:   •  simvastatin (ZOCOR) 20 mg tablet, , Disp: , Rfl:   •  predniSONE 20 mg tablet, Take 2 tablets (40 mg total) by mouth daily for 3 days, THEN 1 5 tablets (30 mg total) daily for 3 days, THEN 1 tablet (20 mg total) daily for 3 days, THEN 0 5 tablets (10 mg total) daily for 3 days  (Patient not taking: Reported on 2/22/2023), Disp: 15 tablet, Rfl: 0    Allergies   Allergen Reactions   • Penicillins Rash       Social History   History reviewed  No pertinent surgical history  History reviewed  No pertinent family history      Objective:  /60 (BP Location: Left arm, Patient Position: Sitting, Cuff Size: Standard)   Pulse 65   Temp 98 2 °F (36 8 °C) (Temporal)   Ht 5' 11" (1 803 m)   Wt 88 9 kg (196 lb)   SpO2 98%   BMI 27 34 kg/m²     Recent Results (from the past 1344 hour(s))   CBC    Collection Time: 02/09/23  7:27 AM   Result Value Ref Range    WBC 7 08 4 31 - 10 16 Thousand/uL    RBC 4 65 3 88 - 5 62 Million/uL    Hemoglobin 14 7 12 0 - 17 0 g/dL    Hematocrit 44 6 36 5 - 49 3 %    MCV 96 82 - 98 fL    MCH 31 6 26 8 - 34 3 pg    MCHC 33 0 31 4 - 37 4 g/dL    RDW 13 4 11 6 - 15 1 %    Platelets 166 231 - 719 Thousands/uL    MPV 9 9 8 9 - 12 7 fL   Comprehensive metabolic panel    Collection Time: 02/09/23  7:27 AM   Result Value Ref Range    Sodium 138 135 - 147 mmol/L    Potassium 4 3 3 5 - 5 3 mmol/L    Chloride 110 (H) 96 - 108 mmol/L    CO2 23 21 - 32 mmol/L    ANION GAP 5 4 - 13 mmol/L    BUN 26 (H) 5 - 25 mg/dL    Creatinine 2 00 (H) 0 60 - 1 30 mg/dL    Glucose, Fasting 162 (H) 65 - 99 mg/dL    Calcium 9 5 8 3 - 10 1 mg/dL    AST 16 5 - 45 U/L    ALT 24 12 - 78 U/L    Alkaline Phosphatase 71 46 - 116 U/L    Total Protein 7 3 6 4 - 8 4 g/dL Albumin 4 1 3 5 - 5 0 g/dL    Total Bilirubin 0 55 0 20 - 1 00 mg/dL    eGFR 29 ml/min/1 73sq m   Hemoglobin A1C    Collection Time: 02/09/23  7:27 AM   Result Value Ref Range    Hemoglobin A1C 8 5 (H) Normal 3 8-5 6%; PreDiabetic 5 7-6 4%; Diabetic >=6 5%; Glycemic control for adults with diabetes <7 0% %     mg/dl   Lipid panel    Collection Time: 02/09/23  7:27 AM   Result Value Ref Range    Cholesterol 122 See Comment mg/dL    Triglycerides 252 (H) See Comment mg/dL    HDL, Direct 38 (L) >=40 mg/dL    LDL Calculated 34 0 - 100 mg/dL    Non-HDL-Chol (CHOL-HDL) 84 mg/dl   TSH, 3rd generation with Free T4 reflex    Collection Time: 02/09/23  7:27 AM   Result Value Ref Range    TSH 3RD GENERATON 3 920 0 450 - 4 500 uIU/mL   C-reactive protein    Collection Time: 02/09/23  7:27 AM   Result Value Ref Range    CRP 6 1 (H) <3 0 mg/L   Microalbumin / creatinine urine ratio    Collection Time: 02/09/23  7:27 AM   Result Value Ref Range    Creatinine, Ur 76 0 mg/dL    Microalbum  ,U,Random 64 8 (H) 0 0 - 20 0 mg/L    Microalb Creat Ratio 85 (H) 0 - 30 mg/g creatinine   UA w Reflex to Microscopic w Reflex to Culture -Lab Collect    Collection Time: 02/09/23  7:27 AM    Specimen: Urine   Result Value Ref Range    Color, UA Light Yellow     Clarity, UA Clear     Specific Gravity, UA 1 017 1 003 - 1 030    pH, UA 6 0 4 5, 5 0, 5 5, 6 0, 6 5, 7 0, 7 5, 8 0    Leukocytes, UA Large (A) Negative    Nitrite, UA Positive (A) Negative    Protein, UA Trace (A) Negative mg/dl    Glucose, UA >=1000 (1%) (A) Negative mg/dl    Ketones, UA Negative Negative mg/dl    Urobilinogen, UA <2 0 <2 0 mg/dl mg/dl    Bilirubin, UA Negative Negative    Occult Blood, UA Negative Negative   PTH, intact    Collection Time: 02/09/23  7:27 AM   Result Value Ref Range    PTH 33 6 18 4 - 80 1 pg/mL   PSA Total, Diagnostic    Collection Time: 02/09/23  7:27 AM   Result Value Ref Range    PSA, Diagnostic 2 6 0 0 - 4 0 ng/mL   Urine Microscopic Collection Time: 02/09/23  7:27 AM   Result Value Ref Range    RBC, UA 1-2 None Seen, 1-2 /hpf    WBC, UA Innumerable (A) None Seen, 1-2 /hpf    Epithelial Cells None Seen None Seen, Occasional /hpf    Bacteria, UA Occasional None Seen, Occasional /hpf   Urine culture    Collection Time: 02/09/23  7:27 AM    Specimen: Urine   Result Value Ref Range    Urine Culture >100,000 cfu/ml Escherichia coli (A)        Susceptibility    Escherichia coli - LARRY     ZID Performed Yes       Amoxicillin + Clavulanate >16/8 Resistant ug/ml     Ampicillin ($$) >16 00 Resistant ug/ml     Ampicillin + Sulbactam ($) 16/8 Intermediate ug/ml     Aztreonam ($$$)  <=4 Susceptible ug/ml     Cefazolin ($) >16 00 Resistant ug/ml     Cefuroxime ($$) 8 Susceptible ug/ml     Ciprofloxacin ($)  <=0 25 Susceptible ug/ml     Ertapenem ($$$) <=0 5 Susceptible ug/ml     Gentamicin ($$) <=2 Susceptible ug/ml     Levofloxacin ($) <=0 50 Susceptible ug/ml     Nitrofurantoin <=32 Susceptible ug/ml     Piperacillin + Tazobactam ($$$) <=8 Susceptible ug/ml     Tetracycline <=4 Susceptible ug/ml     Tobramycin ($) <=2 Susceptible ug/ml     Trimethoprim + Sulfamethoxazole ($$$) <=0 5/9 5 Susceptible ug/ml            Physical Exam  Vitals and nursing note reviewed  Constitutional:       General: He is not in acute distress  Appearance: He is well-developed  He is not diaphoretic  HENT:      Head: Normocephalic and atraumatic  Eyes:      General: No scleral icterus  Right eye: No discharge  Left eye: No discharge  Conjunctiva/sclera: Conjunctivae normal       Pupils: Pupils are equal, round, and reactive to light  Neck:      Thyroid: No thyromegaly  Vascular: No JVD  Cardiovascular:      Rate and Rhythm: Normal rate and regular rhythm  Pulses: no weak pulses          Posterior tibial pulses are 2+ on the right side and 2+ on the left side  Heart sounds: Normal heart sounds  No murmur heard  No friction rub   No gallop  Pulmonary:      Effort: Pulmonary effort is normal  No respiratory distress  Breath sounds: Normal breath sounds  No wheezing or rales  Chest:      Chest wall: No tenderness  Abdominal:      General: Bowel sounds are normal  There is no distension  Palpations: Abdomen is soft  There is no mass  Tenderness: There is no abdominal tenderness  There is no guarding or rebound  Musculoskeletal:         General: No tenderness or deformity  Normal range of motion  Cervical back: Normal range of motion and neck supple  Feet:    Feet:      Right foot:      Skin integrity: No ulcer, skin breakdown, erythema, warmth, callus or dry skin  Left foot:      Skin integrity: No ulcer, skin breakdown, erythema, warmth, callus or dry skin  Lymphadenopathy:      Cervical: No cervical adenopathy  Skin:     General: Skin is warm and dry  Coloration: Skin is not pale  Findings: Rash present  No erythema  Comments: diffuse hives, appears improving  Neurological:      Mental Status: He is alert and oriented to person, place, and time  Cranial Nerves: No cranial nerve deficit  Coordination: Coordination normal       Deep Tendon Reflexes: Reflexes are normal and symmetric  Psychiatric:         Behavior: Behavior normal          Thought Content: Thought content normal          Judgment: Judgment normal                  Diabetic Foot Exam    Patient's shoes and socks removed  Right Foot/Ankle   Right Foot Inspection  Skin Exam: skin normal and skin intact  No dry skin, no warmth, no callus, no erythema, no maceration, no abnormal color, no pre-ulcer, no ulcer and no callus  Toe Exam: ROM and strength within normal limits  Sensory   Monofilament testing: intact    Vascular  Capillary refills: < 3 seconds  The right PT pulse is 2+  Left Foot/Ankle  Left Foot Inspection  Skin Exam: skin normal and skin intact   No dry skin, no warmth, no erythema, no maceration, normal color, no pre-ulcer, no ulcer and no callus  Toe Exam: ROM and strength within normal limits  Sensory   Monofilament testing: intact    Vascular  Capillary refills: < 3 seconds  The left PT pulse is 2+       Assign Risk Category  No deformity present  No loss of protective sensation  No weak pulses  Risk: 0

## 2023-02-22 NOTE — ASSESSMENT & PLAN NOTE
Continue prednisone with taper and continue to hold Myrbetriq  Follow-up with PCP, next appointment is next week

## 2023-02-23 ENCOUNTER — TELEPHONE (OUTPATIENT)
Dept: INTERNAL MEDICINE CLINIC | Facility: OTHER | Age: 88
End: 2023-02-23

## 2023-02-23 NOTE — TELEPHONE ENCOUNTER
Contacted patient's primary care doctor at [facility], Dr Nusrat Major, to review recent blood work and concern for acute kidney injury where patient's creatinine increased from 1 5-2 0 with GFR change of 44-29  He canceled his appointment with her which was scheduled on 2/27/2023 however during yesterday's appointment he did say he was going to see her on that date  She will have her office staff contact him to reschedule  We also discussed patient's recent visits and need for follow-up regarding hives

## 2023-03-01 ENCOUNTER — TELEPHONE (OUTPATIENT)
Dept: ADMINISTRATIVE | Facility: OTHER | Age: 88
End: 2023-03-01

## 2023-03-01 NOTE — TELEPHONE ENCOUNTER
Upon review of the In Basket request we were able to locate, review, and update the patient chart as requested for Medicare AW  Any additional questions or concerns should be emailed to the Practice Liaisons via the appropriate education email address, please do not reply via In Basket      Thank you  Paul Correa

## 2023-03-01 NOTE — TELEPHONE ENCOUNTER
----- Message from [facility] sent at 2/28/2023 12:22 PM EST -----  02/28/23 12:22 PM    Hello, our patient Tanja Simental has had Medicare AWV completed/performed  Please assist in updating the patient chart by pulling the Care Everywhere (CE) document  The date of service is 8/22/2022       Thank you,  [facility]

## 2023-09-28 ENCOUNTER — OFFICE VISIT (OUTPATIENT)
Dept: INTERNAL MEDICINE CLINIC | Facility: OTHER | Age: 88
End: 2023-09-28
Payer: COMMERCIAL

## 2023-09-28 ENCOUNTER — TELEPHONE (OUTPATIENT)
Dept: INTERNAL MEDICINE CLINIC | Facility: OTHER | Age: 88
End: 2023-09-28

## 2023-09-28 VITALS
TEMPERATURE: 97.3 F | HEART RATE: 66 BPM | HEIGHT: 71 IN | WEIGHT: 191.8 LBS | OXYGEN SATURATION: 98 % | SYSTOLIC BLOOD PRESSURE: 122 MMHG | DIASTOLIC BLOOD PRESSURE: 70 MMHG | BODY MASS INDEX: 26.85 KG/M2

## 2023-09-28 DIAGNOSIS — S81.801A WOUND OF RIGHT LEG, INITIAL ENCOUNTER: Primary | ICD-10-CM

## 2023-09-28 PROCEDURE — 99213 OFFICE O/P EST LOW 20 MIN: CPT

## 2023-09-28 RX ORDER — ACETAMINOPHEN 500 MG
500 TABLET ORAL EVERY 12 HOURS PRN
COMMUNITY

## 2023-09-28 NOTE — PROGRESS NOTES
[facility]  INTERNAL MEDICINE        NAME: Dennis Griffiths  AGE: 81 y.o. SEX: male    DATE OF ENCOUNTER: 9/28/2023    ASSESSMENT AND PLAN     1. Wound of right leg, initial encounter  · Patient with history of type 2 diabetes and A-fib on Eliquis presents with wound on his right shin. He reports a chair landed on his leg 2 days ago. He has mild pain but otherwise is able to ambulate freely and no other associated systemic complaints. · On physical exam, wound appears to be superficial and in healing process. Right leg appears slightly swollen than left but without redness, warmth or tenderness. Normal strength and sensation. · Discussed with patient to monitor clinically as wound appears to be superficial and in the healing process. Informed patient if he notices any changes or symptoms does not resolve over time then to reach out to the clinic. Patient agrees and understands. No orders of the defined types were placed in this encounter. CHIEF COMPLAINT     Chief Complaint   Patient presents with   • Wound Check     Pt dropped chair on right leg on Tuesday. Leg is getting sore and red. Pt lives upstairs  independent living. HISTORY OF PRESENT ILLNESS     41-year-old male with a past medical history of type 2 diabetes, A-fib on Eliquis, hypertension, CKD, rheumatoid arthritis who presents with wound on his right shin. He reports he was carrying a chair when the chair suddenly fell on his leg and caused wound. He has mild pain but is able to ambulate fairly. Denies any fever, chills, chest pain, SOB, palpitation, abdominal pain, n/v, lightheadedness/dizziness. The following portions of the patient's history were reviewed and updated as appropriate: allergies, current medications, past family history, past medical history, past social history, past surgical history and problem list.    REVIEW OF SYSTEMS     Review of Systems   Constitutional: Negative for chills and fever. HENT: Negative for congestion and sore throat. Cardiovascular: Negative for chest pain and dyspnea on exertion. Respiratory: Negative for cough and shortness of breath. Skin:        Wound on right shin   Musculoskeletal: Negative for joint pain. Gastrointestinal: Negative for abdominal pain, nausea and vomiting. All other systems reviewed and are negative.         All other ROS negative except per HPI    ACTIVE PROBLEM LIST     Patient Active Problem List   Diagnosis   • Hives   • Atrial fibrillation ([address])   • Benign prostatic hyperplasia without lower urinary tract symptoms   • Bundle branch block   • Stage 3b chronic kidney disease (HCC)   • Cortical senile cataract   • Displacement of lumbar intervertebral disc   • Essential hypertension   • Generalized anxiety disorder   • High prostate specific antigen (PSA)   • Mixed hyperlipidemia   • Hypertension associated with stage 3 chronic kidney disease due to type 2 diabetes mellitus (HCC)   • Hyperuricemia   • Lumbosacral spondylosis   • Osteoarthritis   • Osteopenia of multiple sites   • Other ill-defined and unknown causes of morbidity and mortality   • Overactive bladder   • Proteinuria   • Psychosexual dysfunction with inhibited sexual excitement   • Rheumatoid arthritis ([address])   • Secondary hyperparathyroidism of renal origin ([address])   • Thoracic or lumbosacral neuritis or radiculitis   • Type 2 diabetes mellitus ([address])   • Chronic renal disease, stage IV ([facility])   • REAGAN (acute kidney injury) ([address])       Past Medical History:   Diagnosis Date   • Chronic kidney disease, stage 3 (HCC)     with secondary hyperparathyroidism of renal origin   • Diabetes mellitus ([address])    • Hyperlipidemia    • Hypertension    • Rheumatoid arthritis ([address])        CURRENT MEDICATIONS       Current Outpatient Medications:   •  Accu-Chek Lois Plus test strip, , Disp: , Rfl:   •  Accu-Chek FastClix Lancets MISC, , Disp: , Rfl:   •  acetaminophen (TYLENOL) 500 mg tablet, Take 500 mg by mouth every 12 (twelve) hours as needed, Disp: , Rfl:   •  amLODIPine (NORVASC) 10 mg tablet, TAKE 1 TABLET BY MOUTH ONCE A DAY, Disp: , Rfl:   •  Cholecalciferol (VITAMIN D3) 50 MCG (2000 UT) TABS, 1 capsule, Disp: , Rfl:   •  dapagliflozin (Farxiga) 10 MG tablet, Take 10 mg by mouth daily, Disp: , Rfl:   •  Eliquis 2.5 MG, 2.5 mg 2 (two) times a day, Disp: , Rfl:   •  hydroxychloroquine (PLAQUENIL) 200 mg tablet, Take 200 mg by mouth 2 (two) times a day, Disp: , Rfl:   •  lisinopril (ZESTRIL) 40 mg tablet, Take 1 tablet by mouth daily, Disp: , Rfl:   •  metoprolol tartrate (LOPRESSOR) 25 mg tablet, Take 12.5 mg by mouth every 12 (twelve) hours, Disp: , Rfl:   •  mirtazapine (REMERON) 15 mg tablet, Take 15 mg by mouth daily at bedtime, Disp: , Rfl:   •  Multiple Vitamins-Minerals (CENTRUM SILVER 50+MEN PO), , Disp: , Rfl:   •  simvastatin (ZOCOR) 20 mg tablet, Take 20 mg by mouth daily at bedtime, Disp: , Rfl:   •  Jardiance 10 MG TABS tablet, Take 10 mg by mouth daily (Patient not taking: Reported on 9/28/2023), Disp: , Rfl:   •  naproxen sodium (ALEVE) 220 MG tablet, , Disp: , Rfl:   •  Omega-3 Fatty Acids (fish oil) 1,000 mg, , Disp: , Rfl:     Allergies   Allergen Reactions   • Penicillins Rash       Social History   History reviewed. No pertinent surgical history. History reviewed. No pertinent family history. OBJECTIVE     /70 (BP Location: Left arm, Patient Position: Sitting, Cuff Size: Adult)   Pulse 66   Temp (!) 97.3 °F (36.3 °C) (Tympanic)   Ht 5' 11" (1.803 m)   Wt 87 kg (191 lb 12.8 oz)   SpO2 98% Comment: ra  BMI 26.75 kg/m²     Physical Exam  Vitals reviewed. Constitutional:       Appearance: Normal appearance. He is normal weight. HENT:      Head: Normocephalic and atraumatic. Cardiovascular:      Rate and Rhythm: Normal rate and regular rhythm. Pulses: Normal pulses. Heart sounds: Normal heart sounds.    Pulmonary:      Effort: Pulmonary effort is normal. Breath sounds: Normal breath sounds. Abdominal:      General: Bowel sounds are normal.      Palpations: Abdomen is soft. Musculoskeletal:         General: Swelling and signs of injury (superficial wound on right shin) present. No tenderness. Normal range of motion. Skin:     Findings: Bruising present. No erythema. Neurological:      General: No focal deficit present. Mental Status: He is alert and oriented to person, place, and time. Pertinent Laboratory/Diagnostic Studies:     Patient was never admitted.     Images and diagnostics reviewed     HEALTH MAINTENANCE     Health Maintenance   Topic Date Due   • DM Eye Exam  Never done   • COVID-19 Vaccine (5 - Moderna series) 08/10/2023   • Medicare Annual Wellness Visit (AWV)  08/22/2023   • Depression Screening  02/08/2024   • BMI: Followup Plan  02/22/2024   • BMI: Adult  02/22/2024   • Fall Risk  02/08/2024   • Kidney Health Evaluation: Albumin/Creatinine Ratio  02/09/2024   • Diabetic Foot Exam  02/22/2024   • Kidney Health Evaluation: GFR  07/21/2024   • Pneumococcal Vaccine: 65+ Years  Completed   • Influenza Vaccine  Completed   • HIB Vaccine  Aged Out   • IPV Vaccine  Aged Out   • Hepatitis A Vaccine  Aged Out   • Meningococcal ACWY Vaccine  Aged Out   • HPV Vaccine  Aged Out     Immunization History   Administered Date(s) Administered   • COVID-19 MODERNA VACC 0.5 ML IM 03/17/2021, 04/14/2021, 11/03/2021   • COVID-19 Pfizer Vac BIVALENT Gabe-sucrose 12 Yr+ IM 04/10/2023   • INFLUENZA 11/21/1998, 10/26/2005, 11/01/2006, 10/17/2007, 10/14/2008, 09/01/2012, 11/19/2012, 08/21/2013, 09/03/2013, 10/01/2013, 03/11/2014, 11/04/2014, 10/07/2015, 01/01/2016, 11/08/2016, 09/25/2017, 09/20/2018, 09/30/2019, 10/02/2019, 10/07/2020, 10/20/2020, 11/01/2021   • Influenza Split High Dose Preservative Free IM 12/27/2022   • Influenza, Seasonal Vaccine, Quadrivalent, Adjuvanted, .5e 09/30/2020, 11/08/2021, 10/18/2022   • Influenza, high dose seasonal 0.7 mL 09/13/2023   • Influenza, seasonal, injectable, preservative free 09/16/2016   • Pneumococcal 02/27/2006   • Pneumococcal Conjugate 13-Valent 02/25/2015   • Pneumococcal Polysaccharide PPV23 10/30/2002   • Tdap 06/01/2010, 08/21/2013, 04/10/2014, 08/28/2021   • Zoster 03/10/2016   • Zoster Vaccine Recombinant 09/20/2018, 03/20/2019       I globally spent 35 minutes face-to-face with the patient and with chart review.      [address] James Mcqueen, DO  Internal Medicine PGY-2

## 2023-11-10 LAB
LEFT EYE DIABETIC RETINOPATHY: NORMAL
RIGHT EYE DIABETIC RETINOPATHY: NORMAL

## 2024-05-06 ENCOUNTER — OFFICE VISIT (OUTPATIENT)
Dept: INTERNAL MEDICINE CLINIC | Facility: OTHER | Age: 89
End: 2024-05-06
Payer: COMMERCIAL

## 2024-05-06 VITALS
BODY MASS INDEX: 24.5 KG/M2 | SYSTOLIC BLOOD PRESSURE: 116 MMHG | DIASTOLIC BLOOD PRESSURE: 78 MMHG | TEMPERATURE: 98 F | HEIGHT: 71 IN | OXYGEN SATURATION: 97 % | HEART RATE: 63 BPM | WEIGHT: 175 LBS

## 2024-05-06 DIAGNOSIS — J06.9 ACUTE URI: Primary | ICD-10-CM

## 2024-05-06 PROCEDURE — G2211 COMPLEX E/M VISIT ADD ON: HCPCS | Performed by: INTERNAL MEDICINE

## 2024-05-06 PROCEDURE — 99213 OFFICE O/P EST LOW 20 MIN: CPT | Performed by: INTERNAL MEDICINE

## 2024-05-06 RX ORDER — AZITHROMYCIN 250 MG/1
TABLET, FILM COATED ORAL
Qty: 6 TABLET | Refills: 0 | Status: SHIPPED | OUTPATIENT
Start: 2024-05-06 | End: 2024-05-10

## 2024-05-06 RX ORDER — BENZONATATE 100 MG/1
100 CAPSULE ORAL 3 TIMES DAILY PRN
Qty: 20 CAPSULE | Refills: 0 | Status: SHIPPED | OUTPATIENT
Start: 2024-05-06

## 2024-05-06 NOTE — PROGRESS NOTES
Assessment/Plan:    Acute URI  Discussed OTC medications to take for symptom relief. Will prescribe azithromycin and benzonatate.        Diagnoses and all orders for this visit:    Acute URI  -     benzonatate (TESSALON PERLES) 100 mg capsule; Take 1 capsule (100 mg total) by mouth 3 (three) times a day as needed for cough  -     azithromycin (ZITHROMAX) 250 mg tablet; Take 2 tablets today then 1 tablet daily x 4 days                  Subjective:      Patient ID: Calvin Egan is a 88 y.o. male.    Chief Complaint   Patient presents with   • Cold Like Symptoms     Cough for two- three weeks ongoing , has tried OTC medications not helping     • Fatigue       Calvin Egan is seen today with concern for acute URI of 1.5 weeks duration.     Fatigue  Associated symptoms include congestion, coughing, fatigue and headaches. Pertinent negatives include no abdominal pain, chest pain, chills, diaphoresis, fever, nausea, numbness, sore throat, vomiting or weakness.   URI   This is a new problem. The current episode started 1 to 4 weeks ago. The problem has been unchanged. There has been no fever. Associated symptoms include congestion, coughing and headaches. Pertinent negatives include no abdominal pain, chest pain, diarrhea, dysuria, nausea, rhinorrhea, sinus pain, sneezing, sore throat, vomiting or wheezing. He has tried decongestant for the symptoms. The treatment provided no relief.       The following portions of the patient's history were reviewed and updated as appropriate: allergies, current medications, past family history, past medical history, past social history, past surgical history, and problem list.    Review of Systems   Constitutional:  Positive for fatigue. Negative for activity change, appetite change, chills, diaphoresis and fever.   HENT:  Positive for congestion. Negative for postnasal drip, rhinorrhea, sinus pressure, sinus pain, sneezing and sore throat.    Eyes:  Negative for visual disturbance.    Respiratory:  Positive for cough. Negative for apnea, choking, chest tightness, shortness of breath and wheezing.    Cardiovascular:  Negative for chest pain, palpitations and leg swelling.   Gastrointestinal:  Negative for abdominal distention, abdominal pain, anal bleeding, blood in stool, constipation, diarrhea, nausea and vomiting.   Endocrine: Negative for cold intolerance and heat intolerance.   Genitourinary:  Negative for difficulty urinating, dysuria and hematuria.   Musculoskeletal: Negative.    Skin: Negative.    Neurological:  Positive for headaches. Negative for dizziness, weakness, light-headedness and numbness.   Hematological:  Negative for adenopathy.   Psychiatric/Behavioral:  Negative for agitation, sleep disturbance and suicidal ideas.    All other systems reviewed and are negative.        Past Medical History:   Diagnosis Date   • Chronic kidney disease, stage 3 (HCC)     with secondary hyperparathyroidism of renal origin   • Diabetes mellitus (HCC)    • Hyperlipidemia    • Hypertension    • Rheumatoid arthritis (HCC)          Current Outpatient Medications:   •  Accu-Chek Lois Plus test strip, , Disp: , Rfl:   •  Accu-Chek FastClix Lancets MISC, , Disp: , Rfl:   •  amLODIPine (NORVASC) 10 mg tablet, TAKE 1 TABLET BY MOUTH ONCE A DAY, Disp: , Rfl:   •  azithromycin (ZITHROMAX) 250 mg tablet, Take 2 tablets today then 1 tablet daily x 4 days, Disp: 6 tablet, Rfl: 0  •  benzonatate (TESSALON PERLES) 100 mg capsule, Take 1 capsule (100 mg total) by mouth 3 (three) times a day as needed for cough, Disp: 20 capsule, Rfl: 0  •  Cholecalciferol (VITAMIN D3) 50 MCG (2000 UT) TABS, 1 capsule, Disp: , Rfl:   •  dapagliflozin (Farxiga) 10 MG tablet, Take 10 mg by mouth daily, Disp: , Rfl:   •  Eliquis 2.5 MG, 2.5 mg 2 (two) times a day, Disp: , Rfl:   •  hydroxychloroquine (PLAQUENIL) 200 mg tablet, Take 200 mg by mouth 2 (two) times a day, Disp: , Rfl:   •  lisinopril (ZESTRIL) 40 mg tablet, Take 1  "tablet by mouth daily, Disp: , Rfl:   •  metoprolol tartrate (LOPRESSOR) 25 mg tablet, Take 12.5 mg by mouth every 12 (twelve) hours, Disp: , Rfl:   •  mirtazapine (REMERON) 15 mg tablet, Take 15 mg by mouth daily at bedtime, Disp: , Rfl:   •  Multiple Vitamins-Minerals (CENTRUM SILVER 50+MEN PO), , Disp: , Rfl:   •  simvastatin (ZOCOR) 20 mg tablet, Take 20 mg by mouth daily at bedtime, Disp: , Rfl:     Allergies   Allergen Reactions   • Sulfamethoxazole-Trimethoprim Hives   • Penicillins Rash       Social History   History reviewed. No pertinent surgical history.  History reviewed. No pertinent family history.    Objective:  /78 (BP Location: Left arm, Patient Position: Sitting, Cuff Size: Standard)   Pulse 63   Temp 98 °F (36.7 °C) (Temporal)   Ht 5' 11\" (1.803 m)   Wt 79.4 kg (175 lb)   SpO2 97%   BMI 24.41 kg/m²     Recent Results (from the past 1344 hour(s))   ALBUMIN / CREATININE URINE RATIO    Collection Time: 03/14/24 11:08 AM   Result Value Ref Range    Creatinine, Ur 75.5 50.0 - 200.0 mg/dL    Albumin,U,Random 37.4 (H) <3.0 mg/dL    Albumin Creat Ratio 495.4 (H) <30.0 mg/gm CREA   HEMOGLOBIN A1C    Collection Time: 03/14/24 11:08 AM   Result Value Ref Range    Hemoglobin A1C 7.6 (H) <5.7 %    eAG, EST AVG Glucose 171 mg/dL   COMPREHENSIVE METABOLIC PANEL    Collection Time: 03/14/24 11:08 AM   Result Value Ref Range    Glucose 123 (H) 65 - 99 mg/dL    BUN 27 7 - 28 mg/dL    Creatinine 1.88 (H) 0.53 - 1.30 mg/dL    Sodium 141 135 - 145 mmol/L    Potassium 4.1 3.5 - 5.2 mmol/L    Chloride 105 100 - 109 mmol/L    Carbon Dioxide 24 21 - 31 mmol/L    Calcium 9.7 8.5 - 10.1 mg/dL    Alkaline Phosphatase 69 35 - 120 U/L    ALBUMIN 5.0 3.5 - 5.7 g/dL    Total Bilirubin 0.7 0.2 - 1.0 mg/dL    Protein, Total 7.1 6.3 - 8.3 g/dL    AST 19 <41 U/L    ALT 15 <56 U/L    ANION GAP 12 (H) 3 - 11    eGFRcr 34 (L) >59    eGFR Comment Interpretive information: calculated GFR             Physical Exam  Vitals and " nursing note reviewed.   Constitutional:       General: He is not in acute distress.     Appearance: He is well-developed. He is not diaphoretic.   HENT:      Head: Normocephalic and atraumatic.   Eyes:      General: No scleral icterus.        Right eye: No discharge.         Left eye: No discharge.      Conjunctiva/sclera: Conjunctivae normal.      Pupils: Pupils are equal, round, and reactive to light.   Neck:      Thyroid: No thyromegaly.      Vascular: No JVD.   Cardiovascular:      Rate and Rhythm: Normal rate and regular rhythm.      Heart sounds: Normal heart sounds. No murmur heard.     No friction rub. No gallop.   Pulmonary:      Effort: Pulmonary effort is normal. No respiratory distress.      Breath sounds: Normal breath sounds. No wheezing or rales.   Chest:      Chest wall: No tenderness.   Abdominal:      General: Bowel sounds are normal. There is no distension.      Palpations: Abdomen is soft. There is no mass.      Tenderness: There is no abdominal tenderness. There is no guarding or rebound.   Musculoskeletal:         General: No tenderness or deformity. Normal range of motion.      Cervical back: Normal range of motion and neck supple.   Lymphadenopathy:      Cervical: No cervical adenopathy.   Skin:     General: Skin is warm and dry.      Coloration: Skin is not pale.      Findings: No erythema or rash.   Neurological:      Mental Status: He is alert and oriented to person, place, and time.      Cranial Nerves: No cranial nerve deficit.      Coordination: Coordination normal.      Deep Tendon Reflexes: Reflexes are normal and symmetric.   Psychiatric:         Behavior: Behavior normal.         Thought Content: Thought content normal.         Judgment: Judgment normal.

## 2024-05-06 NOTE — ASSESSMENT & PLAN NOTE
Discussed OTC medications to take for symptom relief. Will prescribe azithromycin and benzonatate.

## 2024-06-05 PROBLEM — J06.9 ACUTE URI: Status: RESOLVED | Noted: 2024-05-06 | Resolved: 2024-06-05

## 2024-07-15 ENCOUNTER — OFFICE VISIT (OUTPATIENT)
Dept: INTERNAL MEDICINE CLINIC | Facility: OTHER | Age: 89
End: 2024-07-15
Payer: COMMERCIAL

## 2024-07-15 VITALS
WEIGHT: 180 LBS | BODY MASS INDEX: 25.1 KG/M2 | HEART RATE: 70 BPM | DIASTOLIC BLOOD PRESSURE: 70 MMHG | OXYGEN SATURATION: 96 % | SYSTOLIC BLOOD PRESSURE: 138 MMHG | TEMPERATURE: 98.4 F

## 2024-07-15 DIAGNOSIS — E11.22 HYPERTENSION ASSOCIATED WITH STAGE 3 CHRONIC KIDNEY DISEASE DUE TO TYPE 2 DIABETES MELLITUS (HCC): ICD-10-CM

## 2024-07-15 DIAGNOSIS — E11.9 TYPE 2 DIABETES MELLITUS WITHOUT COMPLICATION, WITHOUT LONG-TERM CURRENT USE OF INSULIN (HCC): ICD-10-CM

## 2024-07-15 DIAGNOSIS — I48.91 ATRIAL FIBRILLATION, UNSPECIFIED TYPE (HCC): ICD-10-CM

## 2024-07-15 DIAGNOSIS — I12.9 HYPERTENSION ASSOCIATED WITH STAGE 3 CHRONIC KIDNEY DISEASE DUE TO TYPE 2 DIABETES MELLITUS (HCC): ICD-10-CM

## 2024-07-15 DIAGNOSIS — I10 ESSENTIAL HYPERTENSION: ICD-10-CM

## 2024-07-15 DIAGNOSIS — M06.9 RHEUMATOID ARTHRITIS, INVOLVING UNSPECIFIED SITE, UNSPECIFIED WHETHER RHEUMATOID FACTOR PRESENT (HCC): ICD-10-CM

## 2024-07-15 DIAGNOSIS — N18.30 HYPERTENSION ASSOCIATED WITH STAGE 3 CHRONIC KIDNEY DISEASE DUE TO TYPE 2 DIABETES MELLITUS (HCC): ICD-10-CM

## 2024-07-15 DIAGNOSIS — J06.9 VIRAL UPPER RESPIRATORY TRACT INFECTION: Primary | ICD-10-CM

## 2024-07-15 PROCEDURE — G2211 COMPLEX E/M VISIT ADD ON: HCPCS | Performed by: INTERNAL MEDICINE

## 2024-07-15 PROCEDURE — 99213 OFFICE O/P EST LOW 20 MIN: CPT | Performed by: INTERNAL MEDICINE

## 2024-07-15 RX ORDER — FEXOFENADINE HCL 180 MG/1
180 TABLET ORAL DAILY
Qty: 30 TABLET | Refills: 0 | Status: SHIPPED | OUTPATIENT
Start: 2024-07-15 | End: 2024-08-14

## 2024-07-15 RX ORDER — FLUTICASONE PROPIONATE 50 MCG
1 SPRAY, SUSPENSION (ML) NASAL DAILY
Qty: 15.8 ML | Refills: 3 | Status: SHIPPED | OUTPATIENT
Start: 2024-07-15 | End: 2024-08-14

## 2024-07-15 RX ORDER — EMPAGLIFLOZIN 10 MG/1
10 TABLET, FILM COATED ORAL DAILY
COMMUNITY
Start: 2024-06-26

## 2024-07-15 NOTE — PATIENT INSTRUCTIONS
Take tylenol as needed  Start flonase and allegra  If symptoms don't improved in one week return to the office. If you develop fever or chills, Shortness of breath, or worsening cough return to the office.

## 2024-07-15 NOTE — PROGRESS NOTES
INTERNAL MEDICINE OFFICE VISIT     PATIENT INFORMATION     Calvin Egan   88 y.o. male   MRN: [id number]    ASSESSMENT/PLAN     Diagnoses and all orders for this visit:    Viral upper respiratory tract infection  -     fluticasone (FLONASE) 50 mcg/act nasal spray; 1 spray into each nostril daily  -     fexofenadine (ALLEGRA) 180 MG tablet; Take 1 tablet (180 mg total) by mouth daily  .  Tylenol as needed  Return if symptoms improve in 1 week for if you develop fever chills shortness of breath or worsening cough.    Atrial fibrillation, unspecified type (HCC)  Rate controlled, c/w metoprolol and eliquis  Essential hypertension  Controlled  Type 2 diabetes mellitus without complication, without long-term current use of insulin (HCC)  C/w jardiance  Hypertension associated with stage 3 chronic kidney disease due to type 2 diabetes mellitus (HCC)    Rheumatoid arthritis, involving unspecified site, unspecified whether rheumatoid factor present (HCC)    Other orders  -     Jardiance 10 MG TABS tablet; Take 10 mg by mouth daily  Schedule a follow-up appointment if symptoms do not improve in one week, wellness exam with PCP 9/2024.    HEALTH MAINTENANCE     Immunization History   Administered Date(s) Administered    COVID-19 MODERNA VACC 0.5 ML IM 03/17/2021, 04/14/2021, 11/03/2021, 06/18/2022    COVID-19 Pfizer Vac BIVALENT Gabe-sucrose 12 Yr+ IM 04/10/2023    INFLUENZA 11/21/1998, 10/26/2005, 11/01/2006, 10/17/2007, 10/14/2008, 09/01/2012, 11/19/2012, 08/21/2013, 09/03/2013, 10/01/2013, 03/11/2014, 11/04/2014, 10/07/2015, 01/01/2016, 11/08/2016, 09/25/2017, 09/20/2018, 09/30/2019, 10/02/2019, 09/30/2020, 10/07/2020, 10/20/2020, 11/01/2021, 10/18/2022, 09/13/2023    Influenza Split High Dose Preservative Free IM 10/07/2015, 11/08/2016, 09/25/2017, 09/20/2018, 09/30/2019, 12/27/2022    Influenza, Seasonal Vaccine, Quadrivalent, Adjuvanted, .5e 09/30/2020, 11/08/2021, 10/18/2022    Influenza, high dose seasonal 0.7 mL  09/13/2023    Influenza, seasonal, injectable, preservative free 09/16/2016    Pneumococcal 02/27/2006    Pneumococcal Conjugate 13-Valent 02/25/2015    Pneumococcal Polysaccharide PPV23 10/30/2002    Respiratory Syncytial Virus Vaccine (Recombinant, Adjuvanted) 03/13/2024    Tdap 06/01/2010, 08/21/2013, 04/10/2014, 08/28/2021    Tuberculin Skin Test-PPD Intradermal 10/30/2023    Zoster 03/10/2016    Zoster Vaccine Recombinant 09/20/2018, 03/20/2019     CHIEF COMPLAINT     Chief Complaint   Patient presents with    Cold Like Symptoms     Going on 10 days, tried OTC no relief. Dry coughing, chest congestion. Covid negative.     HM     PHQ, DM foot-refused , DM eye- Dr. Terrell in [place]       HISTORY OF PRESENT ILLNESS     Calvin Egan is a 88 y.o. male with a history of afib, HTN, HLD, DM, RA here for URI symptoms x10 days. He c/o clear rhinorrhea, non productive cough, and sore throat.  He denies any fever or chills.  Denies any sick contacts.  Send taking Robitussin as needed for symptoms and to start taking Tylenol yesterday.    REVIEW OF SYSTEMS     Review of Systems  OBJECTIVE     Vitals:    07/15/24 1313   BP: 138/70   BP Location: Left arm   Patient Position: Sitting   Cuff Size: Standard   Pulse: 70   Temp: 98.4 °F (36.9 °C)   TempSrc: Temporal   SpO2: 96%   Weight: 81.6 kg (180 lb)     Physical Exam  Constitutional:       General: He is not in acute distress.     Appearance: He is not ill-appearing or toxic-appearing.   HENT:      Head: Normocephalic and atraumatic.      Right Ear: There is impacted cerumen.      Left Ear: There is impacted cerumen.      Nose: Rhinorrhea present.      Mouth/Throat:      Mouth: Mucous membranes are moist.      Pharynx: Posterior oropharyngeal erythema present. No oropharyngeal exudate.   Cardiovascular:      Rate and Rhythm: Normal rate and regular rhythm.      Pulses: Normal pulses.   Pulmonary:      Effort: Pulmonary effort is normal. No respiratory distress.      Breath  sounds: No stridor. No wheezing, rhonchi or rales.   Skin:     General: Skin is warm and dry.   Neurological:      Mental Status: He is alert and oriented to person, place, and time.       CURRENT MEDICATIONS     Current Outpatient Medications:     Accu-Chek Lois Plus test strip, , Disp: , Rfl:     Accu-Chek FastClix Lancets MISC, , Disp: , Rfl:     amLODIPine (NORVASC) 10 mg tablet, TAKE 1 TABLET BY MOUTH ONCE A DAY, Disp: , Rfl:     Cholecalciferol (VITAMIN D3) 50 MCG (2000 UT) TABS, 1 capsule, Disp: , Rfl:     dapagliflozin (Farxiga) 10 MG tablet, Take 10 mg by mouth daily, Disp: , Rfl:     Eliquis 2.5 MG, 2.5 mg 2 (two) times a day, Disp: , Rfl:     hydroxychloroquine (PLAQUENIL) 200 mg tablet, Take 200 mg by mouth daily with breakfast, Disp: , Rfl:     Jardiance 10 MG TABS tablet, Take 10 mg by mouth daily, Disp: , Rfl:     lisinopril (ZESTRIL) 40 mg tablet, Take 1 tablet by mouth daily, Disp: , Rfl:     metoprolol tartrate (LOPRESSOR) 25 mg tablet, Take 12.5 mg by mouth every 12 (twelve) hours, Disp: , Rfl:     mirtazapine (REMERON) 15 mg tablet, Take 15 mg by mouth daily at bedtime, Disp: , Rfl:     Multiple Vitamins-Minerals (CENTRUM SILVER 50+MEN PO), , Disp: , Rfl:     simvastatin (ZOCOR) 20 mg tablet, Take 20 mg by mouth daily at bedtime, Disp: , Rfl:     benzonatate (TESSALON PERLES) 100 mg capsule, Take 1 capsule (100 mg total) by mouth 3 (three) times a day as needed for cough (Patient not taking: Reported on 7/15/2024), Disp: 20 capsule, Rfl: 0    PAST MEDICAL & SURGICAL HISTORY     Past Medical History:   Diagnosis Date    Chronic kidney disease, stage 3 (HCC)     with secondary hyperparathyroidism of renal origin    Diabetes mellitus (HCC)     Hyperlipidemia     Hypertension     Rheumatoid arthritis (HCC)      History reviewed. No pertinent surgical history.  SOCIAL & FAMILY HISTORY     Social History     Socioeconomic History    Marital status: /Civil Union     Spouse name: Not on file     Number of children: Not on file    Years of education: Not on file    Highest education level: Not on file   Occupational History    Not on file   Tobacco Use    Smoking status: Former     Current packs/day: 1.00     Average packs/day: 1 pack/day for 15.0 years (15.0 ttl pk-yrs)     Types: Cigarettes    Smokeless tobacco: Never   Vaping Use    Vaping status: Never Used   Substance and Sexual Activity    Alcohol use: Not Currently    Drug use: Never    Sexual activity: Not on file   Other Topics Concern    Not on file   Social History Narrative    Not on file     Social Determinants of Health     Financial Resource Strain: Low Risk  (10/19/2023)    [facility]    Overall Financial Resource Strain (CARDIA)     Difficulty of Paying Living Expenses: Not hard at all   Food Insecurity: No Food Insecurity (10/19/2023)    [facility]    Hunger Vital Sign     Worried About Running Out of Food in the Last Year: Never true     Ran Out of Food in the Last Year: Never true   Transportation Needs: No Transportation Needs (10/19/2023)    [facility]    PRAPARE - Transportation     Lack of Transportation (Medical): No     Lack of Transportation (Non-Medical): No   Physical Activity: Not on file   Stress: Stress Concern Present (9/13/2023)    Received from [facility], [facility]    [place] [employer] - Occupational Stress Questionnaire     Feeling of Stress : To some extent   Social Connections: Moderately Integrated (9/13/2023)    [facility]    Social Connection and Isolation Panel [NHANES]     Frequency of Communication with Friends and Family: Twice a week     Frequency of Social Gatherings with Friends and Family: Once a week      Attends Orthodox Services: More than 4 times per year     Active Member of Clubs or Organizations: Yes     Attends Club or Organization Meetings: More than 4 times per year     Marital Status:    Intimate Partner Violence: Not At Risk (10/19/2023)    [facility]    Humiliation, Afraid, Rape, and Kick questionnaire     Fear of Current or Ex-Partner: No     Emotionally Abused: No     Physically Abused: No     Sexually Abused: No   Housing Stability: Low Risk  (10/19/2023)    [facility]    Housing Stability Vital Sign     Unable to Pay for Housing in the Last Year: No     Number of Places Lived in the Last Year: 1     Unstable Housing in the Last Year: No     Social History     Substance and Sexual Activity   Alcohol Use Not Currently       Social History     Substance and Sexual Activity   Drug Use Never     Social History     Tobacco Use   Smoking Status Former    Current packs/day: 1.00    Average packs/day: 1 pack/day for 15.0 years (15.0 ttl pk-yrs)    Types: Cigarettes   Smokeless Tobacco Never     History reviewed. No pertinent family history.      ==  Daphney Barcenas  Internal Medicine Resident, PGY-3  [facility] Internal Medicine Residency

## 2024-09-17 ENCOUNTER — APPOINTMENT (OUTPATIENT)
Dept: LAB | Facility: IMAGING CENTER | Age: 89
End: 2024-09-17
Payer: COMMERCIAL

## 2024-09-17 DIAGNOSIS — E11.69 DIABETES MELLITUS ASSOCIATED WITH HORMONAL ETIOLOGY (HCC): ICD-10-CM

## 2024-09-17 DIAGNOSIS — N18.32 CHRONIC KIDNEY DISEASE (CKD) STAGE G3B/A1, MODERATELY DECREASED GLOMERULAR FILTRATION RATE (GFR) BETWEEN 30-44 ML/MIN/1.73 SQUARE METER AND ALBUMINURIA CREATININE RATIO LESS THAN 30 MG/G (HCC): ICD-10-CM

## 2024-09-17 DIAGNOSIS — E78.5 HYPERLIPIDEMIA, UNSPECIFIED HYPERLIPIDEMIA TYPE: ICD-10-CM

## 2024-09-17 LAB
ALBUMIN SERPL BCG-MCNC: 4.7 G/DL (ref 3.5–5)
ALP SERPL-CCNC: 71 U/L (ref 34–104)
ALT SERPL W P-5'-P-CCNC: 17 U/L (ref 7–52)
ANION GAP SERPL CALCULATED.3IONS-SCNC: 12 MMOL/L (ref 4–13)
AST SERPL W P-5'-P-CCNC: 20 U/L (ref 13–39)
BILIRUB SERPL-MCNC: 0.57 MG/DL (ref 0.2–1)
BUN SERPL-MCNC: 24 MG/DL (ref 5–25)
CALCIUM SERPL-MCNC: 9.4 MG/DL (ref 8.4–10.2)
CHLORIDE SERPL-SCNC: 105 MMOL/L (ref 96–108)
CHOLEST SERPL-MCNC: 125 MG/DL
CO2 SERPL-SCNC: 22 MMOL/L (ref 21–32)
CREAT SERPL-MCNC: 1.71 MG/DL (ref 0.6–1.3)
EST. AVERAGE GLUCOSE BLD GHB EST-MCNC: 180 MG/DL
GFR SERPL CREATININE-BSD FRML MDRD: 34 ML/MIN/1.73SQ M
GLUCOSE P FAST SERPL-MCNC: 176 MG/DL (ref 65–99)
HBA1C MFR BLD: 7.9 %
HDLC SERPL-MCNC: 42 MG/DL
LDLC SERPL CALC-MCNC: 45 MG/DL (ref 0–100)
NONHDLC SERPL-MCNC: 83 MG/DL
POTASSIUM SERPL-SCNC: 4.7 MMOL/L (ref 3.5–5.3)
PROT SERPL-MCNC: 7.6 G/DL (ref 6.4–8.4)
SODIUM SERPL-SCNC: 139 MMOL/L (ref 135–147)
TRIGL SERPL-MCNC: 191 MG/DL

## 2024-09-17 PROCEDURE — 80053 COMPREHEN METABOLIC PANEL: CPT

## 2024-09-17 PROCEDURE — 83036 HEMOGLOBIN GLYCOSYLATED A1C: CPT

## 2024-09-17 PROCEDURE — 80061 LIPID PANEL: CPT

## 2024-09-17 PROCEDURE — 36415 COLL VENOUS BLD VENIPUNCTURE: CPT

## 2024-12-02 ENCOUNTER — OFFICE VISIT (OUTPATIENT)
Dept: INTERNAL MEDICINE CLINIC | Facility: OTHER | Age: 89
End: 2024-12-02
Payer: COMMERCIAL

## 2024-12-02 VITALS
SYSTOLIC BLOOD PRESSURE: 132 MMHG | WEIGHT: 188 LBS | BODY MASS INDEX: 26.32 KG/M2 | TEMPERATURE: 97.9 F | HEIGHT: 71 IN | DIASTOLIC BLOOD PRESSURE: 60 MMHG | HEART RATE: 63 BPM | OXYGEN SATURATION: 98 %

## 2024-12-02 DIAGNOSIS — L89.322 PRESSURE ULCER OF LEFT BUTTOCK, STAGE 2 (HCC): Primary | ICD-10-CM

## 2024-12-02 DIAGNOSIS — E11.51 ANGIOPATHY, DIABETIC (HCC): ICD-10-CM

## 2024-12-02 DIAGNOSIS — N18.4 CHRONIC RENAL DISEASE, STAGE IV (HCC): ICD-10-CM

## 2024-12-02 DIAGNOSIS — N25.81 SECONDARY HYPERPARATHYROIDISM OF RENAL ORIGIN (HCC): ICD-10-CM

## 2024-12-02 PROCEDURE — 99213 OFFICE O/P EST LOW 20 MIN: CPT

## 2024-12-02 PROCEDURE — G2211 COMPLEX E/M VISIT ADD ON: HCPCS

## 2024-12-02 RX ORDER — ANORECTAL OINTMENT 15.7; .44; 24; 20.6 G/100G; G/100G; G/100G; G/100G
OINTMENT TOPICAL 2 TIMES DAILY
Qty: 71 G | Refills: 0 | Status: SHIPPED | OUTPATIENT
Start: 2024-12-02

## 2024-12-02 RX ORDER — GLIPIZIDE 2.5 MG/1
2.5 TABLET, EXTENDED RELEASE ORAL DAILY
COMMUNITY
Start: 2024-10-07 | End: 2025-10-07

## 2024-12-02 RX ORDER — MIRTAZAPINE 30 MG/1
TABLET, FILM COATED ORAL
COMMUNITY
Start: 2024-10-07 | End: 2024-12-12

## 2024-12-02 NOTE — PROGRESS NOTES
"Name: Calvin Egan      : 1935      MRN: [id number]  Encounter Provider: Esme Vilchis PA-C  Encounter Date: 2024   Encounter department: [facility]  :  Assessment & Plan  Pressure ulcer of left buttock, stage 2 (HCC)  Does not appear to be acutely infected  Use calmoseptine ointment over area 2-3 times daily  Donut cushion ordered to be used while sitting at all times.  Avoid pressure to the area  Follow-up with PCP 1 week to ensure improvement, no worsening of ulcer  Advised to make skilled nursing staff aware if any worsening symptoms, drainage, fevers  Orders:    menthol-zinc oxide (Calmoseptine) 0.44-20.6 % OINT; Apply topically 2 (two) times a day    Misc. Devices (Ring Cushion 16\") MISC; Use in the morning    Angiopathy, diabetic (HCC)  Managed by PCP with [facility]           Chronic renal disease, stage IV (HCC)  Ensure good hydration. Avoid nephrotoxins         Secondary hyperparathyroidism of renal origin (HCC)  Managed by PCP with [facility]              History of Present Illness     Patient is an 89-year-old male presenting to the office for concern of pain to the left bottom  Reports it has been \"scaly\" x 2 to 3 weeks  Patient states the skin feels very hard  Endorses area is painful with sitting on it  Denies itching, burning, fever, altered mental status    Tx tried: cream from hospital- unsure which cream this is     Of note, patient is here for acute visit only, PCP is [facility]            Review of Systems   Constitutional:  Negative for chills, fatigue and fever.   Gastrointestinal:  Negative for nausea and vomiting.   Skin:  Positive for wound. Negative for color change and rash.     Medical History Reviewed by provider this encounter:  Tobacco  Allergies  Meds  Problems  Med Hx  Surg Hx  Fam Hx     .     Objective   /60 (BP Location: Left arm, Patient Position: Sitting, Cuff Size: Adult)   Pulse 63   Temp 97.9 °F (36.6 °C) (Temporal)   Ht 5' 11\" " (1.803 m)   Wt 85.3 kg (188 lb)   SpO2 98%   BMI 26.22 kg/m²      Physical Exam  Vitals and nursing note reviewed.   Constitutional:       General: He is not in acute distress.     Appearance: Normal appearance. He is not ill-appearing.   HENT:      Head: Normocephalic and atraumatic.      Right Ear: External ear normal.      Left Ear: External ear normal.      Nose: Nose normal.      Mouth/Throat:      Mouth: Mucous membranes are moist.      Pharynx: Oropharynx is clear.   Eyes:      General: No scleral icterus.     Conjunctiva/sclera: Conjunctivae normal.   Cardiovascular:      Rate and Rhythm: Normal rate and regular rhythm.   Pulmonary:      Effort: Pulmonary effort is normal. No respiratory distress.   Skin:     General: Skin is warm and dry.      Comments: 3 cm pressure wound located over left buttocks, near gluteal fold  Stage 2 pressure wound with minimal skin breakage   No surrounding erythema, drainage  Non-TTP  Crusting of skin over area     Neurological:      General: No focal deficit present.      Mental Status: He is alert and oriented to person, place, and time. Mental status is at baseline.   Psychiatric:         Mood and Affect: Mood normal.         Behavior: Behavior normal.         Disclaimer: This note was generated with voice recognition software.  Phonetic, grammatical, and spelling errors may be present as a result.  Please contact provider with any concerns or questions

## 2024-12-12 ENCOUNTER — OFFICE VISIT (OUTPATIENT)
Dept: INTERNAL MEDICINE CLINIC | Facility: OTHER | Age: 89
End: 2024-12-12
Payer: COMMERCIAL

## 2024-12-12 VITALS
DIASTOLIC BLOOD PRESSURE: 60 MMHG | HEART RATE: 67 BPM | OXYGEN SATURATION: 98 % | BODY MASS INDEX: 26.64 KG/M2 | SYSTOLIC BLOOD PRESSURE: 138 MMHG | WEIGHT: 191 LBS | TEMPERATURE: 97.8 F

## 2024-12-12 DIAGNOSIS — L89.322 PRESSURE INJURY OF LEFT BUTTOCK, STAGE 2 (HCC): Primary | ICD-10-CM

## 2024-12-12 PROCEDURE — G2211 COMPLEX E/M VISIT ADD ON: HCPCS | Performed by: INTERNAL MEDICINE

## 2024-12-12 PROCEDURE — 99213 OFFICE O/P EST LOW 20 MIN: CPT | Performed by: INTERNAL MEDICINE

## 2024-12-12 NOTE — PROGRESS NOTES
Name: Calvin Egan      : 1935      MRN: [id number]  Encounter Provider: Cooper Barros MD  Encounter Date: 2024   Encounter department: [facility]  :  Assessment & Plan  Pressure injury of left buttock, stage 2 (HCC)  Continue Calmoseptine ointment and wound care.  Continue use of donut cushion.  Follow-up with PCP in 1 month.              History of Present Illness     Calvin Egan seen today for follow-up of left buttock pressure ulcer.  He has been applying, Ceftin ointment and keeping the affected area clean and dry.  He has been using a donut cushion.   The wound is healing well and is nontender.       Review of Systems   Constitutional:  Negative for activity change, appetite change, chills, diaphoresis, fatigue and fever.   HENT:  Negative for congestion, postnasal drip, rhinorrhea, sinus pressure, sinus pain, sneezing and sore throat.    Eyes:  Negative for visual disturbance.   Respiratory:  Negative for apnea, cough, choking, chest tightness, shortness of breath and wheezing.    Cardiovascular:  Negative for chest pain, palpitations and leg swelling.   Gastrointestinal:  Negative for abdominal distention, abdominal pain, anal bleeding, blood in stool, constipation, diarrhea, nausea and vomiting.   Endocrine: Negative for cold intolerance and heat intolerance.   Genitourinary:  Negative for difficulty urinating, dysuria and hematuria.   Musculoskeletal: Negative.    Skin:  Positive for wound.   Neurological:  Negative for dizziness, weakness, light-headedness, numbness and headaches.   Hematological:  Negative for adenopathy.   Psychiatric/Behavioral:  Negative for agitation, sleep disturbance and suicidal ideas.    All other systems reviewed and are negative.      Objective   /60 (BP Location: Left arm, Patient Position: Sitting, Cuff Size: Standard)   Pulse 67   Temp 97.8 °F (36.6 °C) (Temporal)   Wt 86.6 kg (191 lb)   SpO2 98%   BMI  26.64 kg/m²      Physical Exam  Vitals and nursing note reviewed.   Constitutional:       General: He is not in acute distress.     Appearance: Normal appearance. He is normal weight. He is not ill-appearing, toxic-appearing or diaphoretic.   HENT:      Head: Normocephalic and atraumatic.      Right Ear: Tympanic membrane, ear canal and external ear normal. There is no impacted cerumen.      Left Ear: Tympanic membrane, ear canal and external ear normal. There is no impacted cerumen.      Nose: Nose normal. No congestion or rhinorrhea.      Mouth/Throat:      Mouth: Mucous membranes are moist.      Pharynx: Oropharynx is clear. No oropharyngeal exudate or posterior oropharyngeal erythema.   Eyes:      General: No scleral icterus.        Right eye: No discharge.         Left eye: No discharge.      Extraocular Movements: Extraocular movements intact.      Conjunctiva/sclera: Conjunctivae normal.      Pupils: Pupils are equal, round, and reactive to light.   Neck:      Vascular: No carotid bruit.   Cardiovascular:      Rate and Rhythm: Normal rate and regular rhythm.      Pulses: Normal pulses.      Heart sounds: Normal heart sounds. No murmur heard.     No friction rub. No gallop.   Pulmonary:      Effort: Pulmonary effort is normal. No respiratory distress.      Breath sounds: Normal breath sounds. No wheezing or rales.   Abdominal:      General: Abdomen is flat. Bowel sounds are normal. There is no distension.      Palpations: Abdomen is soft. There is no mass.      Tenderness: There is no abdominal tenderness. There is no guarding.      Hernia: No hernia is present.   Musculoskeletal:         General: No swelling, tenderness, deformity or signs of injury. Normal range of motion.      Cervical back: Normal range of motion and neck supple. No rigidity. No muscular tenderness.      Right lower leg: No edema.      Left lower leg: No edema.   Lymphadenopathy:      Cervical: No cervical adenopathy.   Skin:     General:  Skin is warm and dry.      Capillary Refill: Capillary refill takes less than 2 seconds.      Coloration: Skin is not jaundiced or pale.      Findings: No bruising, erythema, lesion or rash.          Neurological:      General: No focal deficit present.      Mental Status: He is alert and oriented to person, place, and time. Mental status is at baseline.      Cranial Nerves: No cranial nerve deficit.      Sensory: No sensory deficit.      Motor: No weakness.      Coordination: Coordination normal.      Gait: Gait normal.      Deep Tendon Reflexes: Reflexes normal.   Psychiatric:         Mood and Affect: Mood normal.         Behavior: Behavior normal.         Thought Content: Thought content normal.         Judgment: Judgment normal.

## 2024-12-12 NOTE — ASSESSMENT & PLAN NOTE
Continue Calmoseptine ointment and wound care.  Continue use of donut cushion.  Follow-up with PCP in 1 month.

## 2025-01-27 ENCOUNTER — OFFICE VISIT (OUTPATIENT)
Dept: INTERNAL MEDICINE CLINIC | Facility: OTHER | Age: 89
End: 2025-01-27
Payer: COMMERCIAL

## 2025-01-27 VITALS
SYSTOLIC BLOOD PRESSURE: 112 MMHG | HEIGHT: 71 IN | HEART RATE: 71 BPM | DIASTOLIC BLOOD PRESSURE: 60 MMHG | OXYGEN SATURATION: 96 % | WEIGHT: 198 LBS | TEMPERATURE: 98.4 F | BODY MASS INDEX: 27.72 KG/M2 | RESPIRATION RATE: 20 BRPM

## 2025-01-27 DIAGNOSIS — L89.322 PRESSURE INJURY OF LEFT BUTTOCK, STAGE 2 (HCC): ICD-10-CM

## 2025-01-27 DIAGNOSIS — N18.30 HYPERTENSION ASSOCIATED WITH STAGE 3 CHRONIC KIDNEY DISEASE DUE TO TYPE 2 DIABETES MELLITUS (HCC): ICD-10-CM

## 2025-01-27 DIAGNOSIS — E11.9 TYPE 2 DIABETES MELLITUS WITHOUT COMPLICATION, WITHOUT LONG-TERM CURRENT USE OF INSULIN (HCC): ICD-10-CM

## 2025-01-27 DIAGNOSIS — Z13.6 SCREENING FOR CARDIOVASCULAR CONDITION: ICD-10-CM

## 2025-01-27 DIAGNOSIS — I12.9 HYPERTENSION ASSOCIATED WITH STAGE 3 CHRONIC KIDNEY DISEASE DUE TO TYPE 2 DIABETES MELLITUS (HCC): ICD-10-CM

## 2025-01-27 DIAGNOSIS — Z00.00 MEDICARE ANNUAL WELLNESS VISIT, SUBSEQUENT: ICD-10-CM

## 2025-01-27 DIAGNOSIS — E11.51 ANGIOPATHY, DIABETIC (HCC): ICD-10-CM

## 2025-01-27 DIAGNOSIS — I10 ESSENTIAL HYPERTENSION: ICD-10-CM

## 2025-01-27 DIAGNOSIS — I48.91 ATRIAL FIBRILLATION, UNSPECIFIED TYPE (HCC): ICD-10-CM

## 2025-01-27 DIAGNOSIS — Z71.89 ADVANCED CARE PLANNING/COUNSELING DISCUSSION: ICD-10-CM

## 2025-01-27 DIAGNOSIS — N25.81 SECONDARY HYPERPARATHYROIDISM OF RENAL ORIGIN (HCC): ICD-10-CM

## 2025-01-27 DIAGNOSIS — N18.32 STAGE 3B CHRONIC KIDNEY DISEASE (HCC): ICD-10-CM

## 2025-01-27 DIAGNOSIS — E78.2 MIXED HYPERLIPIDEMIA: Primary | ICD-10-CM

## 2025-01-27 DIAGNOSIS — E11.22 HYPERTENSION ASSOCIATED WITH STAGE 3 CHRONIC KIDNEY DISEASE DUE TO TYPE 2 DIABETES MELLITUS (HCC): ICD-10-CM

## 2025-01-27 DIAGNOSIS — N18.4 CHRONIC RENAL DISEASE, STAGE IV (HCC): ICD-10-CM

## 2025-01-27 DIAGNOSIS — M06.9 RHEUMATOID ARTHRITIS, INVOLVING UNSPECIFIED SITE, UNSPECIFIED WHETHER RHEUMATOID FACTOR PRESENT (HCC): ICD-10-CM

## 2025-01-27 PROBLEM — R69 OTHER ILL-DEFINED AND UNKNOWN CAUSES OF MORBIDITY AND MORTALITY: Status: RESOLVED | Noted: 2023-02-08 | Resolved: 2025-01-27

## 2025-01-27 PROBLEM — N17.9 AKI (ACUTE KIDNEY INJURY) (HCC): Status: RESOLVED | Noted: 2023-02-22 | Resolved: 2025-01-27

## 2025-01-27 PROBLEM — L50.9 HIVES: Status: RESOLVED | Noted: 2023-01-19 | Resolved: 2025-01-27

## 2025-01-27 LAB — SL AMB POCT HEMOGLOBIN AIC: 7.5 (ref ?–6.5)

## 2025-01-27 PROCEDURE — 99214 OFFICE O/P EST MOD 30 MIN: CPT | Performed by: INTERNAL MEDICINE

## 2025-01-27 PROCEDURE — 83036 HEMOGLOBIN GLYCOSYLATED A1C: CPT | Performed by: INTERNAL MEDICINE

## 2025-01-27 PROCEDURE — G2211 COMPLEX E/M VISIT ADD ON: HCPCS | Performed by: INTERNAL MEDICINE

## 2025-01-27 NOTE — ASSESSMENT & PLAN NOTE
Lab Results   Component Value Date    EGFR 34 09/17/2024    EGFR 38 (L) 05/06/2024    EGFR 34 (L) 03/14/2024    CREATININE 1.71 (H) 09/17/2024    CREATININE 1.70 (H) 05/06/2024    CREATININE 1.88 (H) 03/14/2024   Continue to trend kidney function.  Avoid nephrotoxic agents.

## 2025-01-27 NOTE — PROGRESS NOTES
Name: Calvin Egan      : 1935      MRN: [id number]  Encounter Provider: Cooper Barros MD  Encounter Date: 2025   Encounter department: [facility] & Plan  Medicare annual wellness visit, subsequent         Screening for cardiovascular condition         Mixed hyperlipidemia  Controlled.  Continue simvastatin 20 mg daily.  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   Orders:  •  Albumin / creatinine urine ratio; Future  •  CBC and differential; Future  •  Comprehensive metabolic panel; Future  •  Hemoglobin A1C; Future  •  Lipid panel; Future    Atrial fibrillation, unspecified type (HCC)  Continue metoprolol tartrate and Eliquis anticoagulation.       Type 2 diabetes mellitus without complication, without long-term current use of insulin (HCC)  Continue current  diabetic regimen.  Continue to trend A1c.  Lab Results   Component Value Date    HGBA1C 7.5 (A) 2025     Orders:  •  POCT hemoglobin A1c  •  Albumin / creatinine urine ratio; Future  •  CBC and differential; Future  •  Comprehensive metabolic panel; Future  •  Hemoglobin A1C; Future  •  Lipid panel; Future    Pressure injury of left buttock, stage 2 (HCC)  Healing well.  Continue Calmoseptine cream and use of donut cushion.       Rheumatoid arthritis, involving unspecified site, unspecified whether rheumatoid factor present (HCC)         Chronic renal disease, stage IV (HCC)  Lab Results   Component Value Date    EGFR 34 2024    EGFR 38 (L) 2024    EGFR 34 (L) 2024    CREATININE  1.71 (H) 09/17/2024    CREATININE 1.70 (H) 05/06/2024    CREATININE 1.88 (H) 03/14/2024          Angiopathy, diabetic (HCC)    Lab Results   Component Value Date    HGBA1C 7.5 (A) 01/27/2025          Secondary hyperparathyroidism of renal origin (HCC)         Stage 3b chronic kidney disease (HCC)  Lab Results   Component Value Date    EGFR 34 09/17/2024    EGFR 38 (L) 05/06/2024    EGFR 34 (L) 03/14/2024    CREATININE 1.71 (H) 09/17/2024    CREATININE 1.70 (H) 05/06/2024    CREATININE 1.88 (H) 03/14/2024   Continue to trend kidney function.  Avoid nephrotoxic agents.       Advanced care planning/counseling discussion  Calvin Egan and I had a thorough discussion regarding advance care planning.  he  has a Living Will at home to which I recommended he provide a copy to be scanned for his medical records.  ACP documentation provided to patient today.  he  understands that today's visit is a billable service.  Refer to ACP note.    Serious Illness Conversation    1. What is your understanding now of where you are with your illness?  Prognostic Understanding: appropriate understanding of prognosis     2. How much information about what is likely to be ahead with your illness would you like to have?  Information: patient wants to be fully informed     3. What did you (clinician) communicate to the patient?     4. If your health situation worsens, what are your most important goals?  Goals: have my medical decisions respected, be mentally aware, be at home, be physically comfortable, be emotionally at peace, be spiritually and emotionally at peace, not be a burden     5. What are the biggest fears and worries about the future and your health?  Fears/Worries: loss of control, being a financial burden, being a physical burden, burdening others, being dependent     6. What abilities are so critical to your life that you cannot imagine living without them?  Unacceptable Function: being unconscious     7. What gives you  strength as you think about the future with your illness?     8. If you become sicker, how much are you willing to go through for the possibility of gaining more time?  Be in the hospital: Yes Have a feeding tube: No   Be in the ICU: Yes Live in a nursing home: Yes   Be on a ventilator: No Be uncomfortable: No   Be on dialysis: No Undergo aggressive test and/or procedures: No   9. How much does your proxy and family know about your priorities and wishes?  Discussion Discussion: extensive discussion with family about goals and wishes        How does this plan sound to you? I will do everything I can to help you through this.  Patient verbalized understanding of the plan     I have spent 16 minutes speaking with my patient on advanced care planning today or during this visit     Advanced directives  Five Wishes: Patient does not have Five Wishes- would not like information                 Preventive health issues were discussed with patient, and age appropriate screening tests were ordered as noted in patient's After Visit Summary. Personalized health advice and appropriate referrals for health education or preventive services given if needed, as noted in patient's After Visit Summary.    History of Present Illness     Calvin Egan is seen today for follow up of chronic conditions.   Recent laboratory studies reviewed today with the patient.   he has been compliant with his medication regimen.     he has no complaints or concerns at this time.       Hypertension  This is a chronic problem. The current episode started more than 1 year ago. The problem is unchanged. The problem is controlled. Pertinent negatives include no chest pain, headaches, palpitations or shortness of breath. Past treatments include calcium channel blockers, ACE inhibitors and beta blockers. The current treatment provides moderate improvement. There are no compliance problems.    Hyperlipidemia  This is a chronic problem. The current episode  started more than 1 year ago. The problem is controlled. Recent lipid tests were reviewed and are normal. Pertinent negatives include no chest pain or shortness of breath. Current antihyperlipidemic treatment includes statins. The current treatment provides moderate improvement of lipids. There are no compliance problems.    Diabetes  He presents for his follow-up diabetic visit. He has type 2 diabetes mellitus. His disease course has been stable. Pertinent negatives for hypoglycemia include no dizziness or headaches. Pertinent negatives for diabetes include no chest pain, no fatigue and no weakness. Current diabetic treatment includes oral agent (dual therapy). He is compliant with treatment all of the time. An ACE inhibitor/angiotensin II receptor blocker is being taken. He sees a podiatrist.Eye exam is current.      Patient Care Team:  Cooper Barros MD as PCP - General (Internal Medicine)  Lily Zamora MD as PCP - [facility]    Review of Systems   Constitutional:  Negative for activity change, appetite change, chills, diaphoresis, fatigue and fever.   HENT:  Negative for congestion, postnasal drip, rhinorrhea, sinus pressure, sinus pain, sneezing and sore throat.    Eyes:  Negative for visual disturbance.   Respiratory:  Negative for apnea, cough, choking, chest tightness, shortness of breath and wheezing.    Cardiovascular:  Negative for chest pain, palpitations and leg swelling.   Gastrointestinal:  Negative for abdominal distention, abdominal pain, anal bleeding, blood in stool, constipation, diarrhea, nausea and vomiting.   Endocrine: Negative for cold intolerance and heat intolerance.   Genitourinary:  Negative for difficulty urinating, dysuria and hematuria.   Musculoskeletal: Negative.    Skin: Negative.    Neurological:  Negative for dizziness, weakness, light-headedness, numbness and headaches.   Hematological:  Negative for adenopathy.   Psychiatric/Behavioral:  Negative for  agitation, sleep disturbance and suicidal ideas.    All other systems reviewed and are negative.    Medical History Reviewed by provider this encounter:  Tobacco  Allergies  Meds  Problems  Med Hx  Surg Hx  Fam Hx       Annual Wellness Visit Questionnaire   Calvin is here for his Subsequent Wellness visit. Last Medicare Wellness visit information reviewed, patient interviewed and updates made to the record today.      Health Risk Assessment:   Patient rates overall health as very good. Patient feels that their physical health rating is slightly better. Patient is satisfied with their life. Eyesight was rated as same. Hearing was rated as same. Patient feels that their emotional and mental health rating is same. Patients states they are never, rarely angry. Patient states they are never, rarely unusually tired/fatigued. Pain experienced in the last 7 days has been some. Patient's pain rating has been 2/10. Patient states that he has experienced no weight loss or gain in last 6 months.     Depression Screening:   PHQ-2 Score: 1      Fall Risk Screening:   In the past year, patient has experienced: no history of falling in past year      Home Safety:  Patient does not have trouble with stairs inside or outside of their home. Patient has working smoke alarms and has no working carbon monoxide detector. Home safety hazards include: none.     Nutrition:   Current diet is Diabetic and Limited junk food.     Medications:   Patient is not currently taking any over-the-counter supplements. Patient is able to manage medications.     Activities of Daily Living (ADLs)/Instrumental Activities of Daily Living (IADLs):   Walk and transfer into and out of bed and chair?: Yes  Dress and groom yourself?: Yes    Bathe or shower yourself?: Yes    Feed yourself? Yes  Do your laundry/housekeeping?: Yes  Manage your money, pay your bills and track your expenses?: Yes  Make your own meals?: No    Do your own shopping?:  Yes    Previous Hospitalizations:   Any hospitalizations or ED visits within the last 12 months?: No      Advance Care Planning:   Living will: Yes    Durable POA for healthcare: No    Advanced directive: Yes    Advanced directive counseling given: Yes    End of Life Decisions reviewed with patient: Yes    Provider agrees with end of life decisions: Yes      Comments: CODE STATUS: DNR/DNI.    Cognitive Screening:   Provider or family/friend/caregiver concerned regarding cognition?: No    PREVENTIVE SCREENINGS      Cardiovascular Screening:    General: Screening Not Indicated, History Lipid Disorder, Risks and Benefits Discussed and Screening Current    Due for: Lipid Panel      Diabetes Screening:     General: Screening Not Indicated, History Diabetes, Risks and Benefits Discussed and Screening Current    Due for: Blood Glucose      Colorectal Cancer Screening:     General: Screening Not Indicated      Prostate Cancer Screening:    General: Screening Not Indicated      Osteoporosis Screening:    General: Screening Not Indicated      Abdominal Aortic Aneurysm (AAA) Screening:    Risk factors include: tobacco use        Lung Cancer Screening:     General: Screening Not Indicated      Hepatitis C Screening:    General: Risks and Benefits Discussed    Screening, Brief Intervention, and Referral to Treatment (SBIRT)    Screening  Typical number of drinks in a day: 0  Typical number of drinks in a week: 0  Interpretation: Low risk drinking behavior.    Single Item Drug Screening:  How often have you used an illegal drug (including marijuana) or a prescription medication for non-medical reasons in the past year? never    Single Item Drug Screen Score: 0  Interpretation: Negative screen for possible drug use disorder    Brief Intervention  Alcohol & drug use screenings were reviewed. No concerns regarding substance use disorder identified.     Other Counseling Topics:   Car/seat belt/driving safety, skin self-exam,  "sunscreen and calcium and vitamin D intake and regular weightbearing exercise.     Social Drivers of Health     Financial Resource Strain: Low Risk  (10/19/2023)    [facility]    Overall Financial Resource Strain (CARDIA)    • Difficulty of Paying Living Expenses: Not hard at all   Food Insecurity: No Food Insecurity (1/27/2025)    Hunger Vital Sign    • Worried About Running Out of Food in the Last Year: Never true    • Ran Out of Food in the Last Year: Never true   Transportation Needs: No Transportation Needs (1/27/2025)    PRAPARE - Transportation    • Lack of Transportation (Medical): No    • Lack of Transportation (Non-Medical): No   Housing Stability: Low Risk  (1/27/2025)    Housing Stability Vital Sign    • Unable to Pay for Housing in the Last Year: No    • Number of Times Moved in the Last Year: 1    • Homeless in the Last Year: No   Utilities: Not At Risk (1/27/2025)    [facility] Utilities    • Threatened with loss of utilities: No     No results found.    Objective   /60 (BP Location: Left arm, Patient Position: Sitting, Cuff Size: Standard)   Pulse 71   Temp 98.4 °F (36.9 °C) (Temporal)   Resp 20   Ht 5' 11\" (1.803 m)   Wt 89.8 kg (198 lb)   SpO2 96%   BMI 27.62 kg/m²     Physical Exam  Vitals and nursing note reviewed.   Constitutional:       General: He is not in acute distress.     Appearance: He is well-developed. He is not diaphoretic.   HENT:      Head: Normocephalic and atraumatic.   Eyes:      General: No scleral icterus.        Right eye: No discharge.         Left eye: No discharge.      Conjunctiva/sclera: Conjunctivae normal.      Pupils: Pupils are equal, round, and reactive to light.   Neck:      Thyroid: No thyromegaly.      Vascular: No JVD.   Cardiovascular:      Rate and Rhythm: Normal rate and regular rhythm.      Heart sounds: Normal heart sounds. No murmur heard.     No friction rub. No gallop.   Pulmonary:      " Effort: Pulmonary effort is normal. No respiratory distress.      Breath sounds: Normal breath sounds. No wheezing or rales.   Chest:      Chest wall: No tenderness.   Abdominal:      General: Bowel sounds are normal. There is no distension.      Palpations: Abdomen is soft. There is no mass.      Tenderness: There is no abdominal tenderness. There is no guarding or rebound.   Musculoskeletal:         General: No tenderness or deformity. Normal range of motion.      Cervical back: Normal range of motion and neck supple.   Lymphadenopathy:      Cervical: No cervical adenopathy.   Skin:     General: Skin is warm and dry.      Coloration: Skin is not pale.      Findings: No erythema or rash.          Neurological:      Mental Status: He is alert and oriented to person, place, and time.      Cranial Nerves: No cranial nerve deficit.      Coordination: Coordination normal.      Deep Tendon Reflexes: Reflexes are normal and symmetric.   Psychiatric:         Behavior: Behavior normal.         Thought Content: Thought content normal.         Judgment: Judgment normal.

## 2025-01-27 NOTE — PATIENT INSTRUCTIONS
Medicare Preventive Visit Patient Instructions  Thank you for completing your Welcome to Medicare Visit or Medicare Annual Wellness Visit today. Your next wellness visit will be due in one year (1/28/2026).  The screening/preventive services that you may require over the next 5-10 years are detailed below. Some tests may not apply to you based off risk factors and/or age. Screening tests ordered at today's visit but not completed yet may show as past due. Also, please note that scanned in results may not display below.  Preventive Screenings:  Service Recommendations Previous Testing/Comments   Colorectal Cancer Screening  Colonoscopy    Fecal Occult Blood Test (FOBT)/Fecal Immunochemical Test (FIT)  Fecal DNA/Cologuard Test  Flexible Sigmoidoscopy Age: 45-75 years old   Colonoscopy: every 10 years (May be performed more frequently if at higher risk)  OR  FOBT/FIT: every 1 year  OR  Cologuard: every 3 years  OR  Sigmoidoscopy: every 5 years  Screening may be recommended earlier than age 45 if at higher risk for colorectal cancer. Also, an individualized decision between you and your healthcare provider will decide whether screening between the ages of 76-85 would be appropriate. Colonoscopy: Not on file  FOBT/FIT: Not on file  Cologuard: Not on file  Sigmoidoscopy: Not on file    Screening Not Indicated     Prostate Cancer Screening Individualized decision between patient and health care provider in men between ages of 55-69   Medicare will cover every 12 months beginning on the day after your 50th birthday PSA: 2.6 ng/mL     Screening Not Indicated     Hepatitis C Screening Once for adults born between 1945 and 1965  More frequently in patients at high risk for Hepatitis C Hep C Antibody: Not on file        Diabetes Screening 1-2 times per year if you're at risk for diabetes or have pre-diabetes Fasting glucose: 176 mg/dL (9/17/2024)  A1C: 7.9 % (9/17/2024)  Screening Not Indicated  History Diabetes   Cholesterol  Screening Once every 5 years if you don't have a lipid disorder. May order more often based on risk factors. Lipid panel: 09/17/2024  Screening Not Indicated  History Lipid Disorder      Other Preventive Screenings Covered by Medicare:  Abdominal Aortic Aneurysm (AAA) Screening: covered once if your at risk. You're considered to be at risk if you have a family history of AAA or a male between the age of 65-75 who smoking at least 100 cigarettes in your lifetime.  Lung Cancer Screening: covers low dose CT scan once per year if you meet all of the following conditions: (1) Age 55-77; (2) No signs or symptoms of lung cancer; (3) Current smoker or have quit smoking within the last 15 years; (4) You have a tobacco smoking history of at least 20 pack years (packs per day x number of years you smoked); (5) You get a written order from a healthcare provider.  Glaucoma Screening: covered annually if you're considered high risk: (1) You have diabetes OR (2) Family history of glaucoma OR (3)  aged 50 and older OR (4)  American aged 65 and older  Osteoporosis Screening: covered every 2 years if you meet one of the following conditions: (1) Have a vertebral abnormality; (2) On glucocorticoid therapy for more than 3 months; (3) Have primary hyperparathyroidism; (4) On osteoporosis medications and need to assess response to drug therapy.  HIV Screening: covered annually if you're between the age of 15-65. Also covered annually if you are younger than 15 and older than 65 with risk factors for HIV infection. For pregnant patients, it is covered up to 3 times per pregnancy.    Immunizations:  Immunization Recommendations   Influenza Vaccine Annual influenza vaccination during flu season is recommended for all persons aged >= 6 months who do not have contraindications   Pneumococcal Vaccine   * Pneumococcal conjugate vaccine = PCV13 (Prevnar 13), PCV15 (Vaxneuvance), PCV20 (Prevnar 20)  * Pneumococcal  polysaccharide vaccine = PPSV23 (Pneumovax) Adults 19-63 yo with certain risk factors or if 65+ yo  If never received any pneumonia vaccine: recommend Prevnar 20 (PCV20)  Give PCV20 if previously received 1 dose of PCV13 or PPSV23   Hepatitis B Vaccine 3 dose series if at intermediate or high risk (ex: diabetes, end stage renal disease, liver disease)   Respiratory syncytial virus (RSV) Vaccine - COVERED BY MEDICARE PART D  * RSVPreF3 (Arexvy) CDC recommends that adults 60 years of age and older may receive a single dose of RSV vaccine using shared clinical decision-making (SCDM)   Tetanus (Td) Vaccine - COST NOT COVERED BY MEDICARE PART B Following completion of primary series, a booster dose should be given every 10 years to maintain immunity against tetanus. Td may also be given as tetanus wound prophylaxis.   Tdap Vaccine - COST NOT COVERED BY MEDICARE PART B Recommended at least once for all adults. For pregnant patients, recommended with each pregnancy.   Shingles Vaccine (Shingrix) - COST NOT COVERED BY MEDICARE PART B  2 shot series recommended in those 19 years and older who have or will have weakened immune systems or those 50 years and older     Health Maintenance Due:  There are no preventive care reminders to display for this patient.  Immunizations Due:  There are no preventive care reminders to display for this patient.  Advance Directives   What are advance directives?  Advance directives are legal documents that state your wishes and plans for medical care. These plans are made ahead of time in case you lose your ability to make decisions for yourself. Advance directives can apply to any medical decision, such as the treatments you want, and if you want to donate organs.   What are the types of advance directives?  There are many types of advance directives, and each state has rules about how to use them. You may choose a combination of any of the following:  Living will:  This is a written record  of the treatment you want. You can also choose which treatments you do not want, which to limit, and which to stop at a certain time. This includes surgery, medicine, IV fluid, and tube feedings.   Durable power of  for healthcare (DPAHC):  This is a written record that states who you want to make healthcare choices for you when you are unable to make them for yourself. This person, called a proxy, is usually a family member or a friend. You may choose more than 1 proxy.  Do not resuscitate (DNR) order:  A DNR order is used in case your heart stops beating or you stop breathing. It is a request not to have certain forms of treatment, such as CPR. A DNR order may be included in other types of advance directives.  Medical directive:  This covers the care that you want if you are in a coma, near death, or unable to make decisions for yourself. You can list the treatments you want for each condition. Treatment may include pain medicine, surgery, blood transfusions, dialysis, IV or tube feedings, and a ventilator (breathing machine).  Values history:  This document has questions about your views, beliefs, and how you feel and think about life. This information can help others choose the care that you would choose.  Why are advance directives important?  An advance directive helps you control your care. Although spoken wishes may be used, it is better to have your wishes written down. Spoken wishes can be misunderstood, or not followed. Treatments may be given even if you do not want them. An advance directive may make it easier for your family to make difficult choices about your care.   Weight Management   Why it is important to manage your weight:  Being overweight increases your risk of health conditions such as heart disease, high blood pressure, type 2 diabetes, and certain types of cancer. It can also increase your risk for osteoarthritis, sleep apnea, and other respiratory problems. Aim for a slow, steady  weight loss. Even a small amount of weight loss can lower your risk of health problems.  How to lose weight safely:  A safe and healthy way to lose weight is to eat fewer calories and get regular exercise. You can lose up about 1 pound a week by decreasing the number of calories you eat by 500 calories each day.   Healthy meal plan for weight management:  A healthy meal plan includes a variety of foods, contains fewer calories, and helps you stay healthy. A healthy meal plan includes the following:  Eat whole-grain foods more often.  A healthy meal plan should contain fiber. Fiber is the part of grains, fruits, and vegetables that is not broken down by your body. Whole-grain foods are healthy and provide extra fiber in your diet. Some examples of whole-grain foods are whole-wheat breads and pastas, oatmeal, brown rice, and bulgur.  Eat a variety of vegetables every day.  Include dark, leafy greens such as spinach, kale, owen greens, and mustard greens. Eat yellow and orange vegetables such as carrots, sweet potatoes, and winter squash.   Eat a variety of fruits every day.  Choose fresh or canned fruit (canned in its own juice or light syrup) instead of juice. Fruit juice has very little or no fiber.  Eat low-fat dairy foods.  Drink fat-free (skim) milk or 1% milk. Eat fat-free yogurt and low-fat cottage cheese. Try low-fat cheeses such as mozzarella and other reduced-fat cheeses.  Choose meat and other protein foods that are low in fat.  Choose beans or other legumes such as split peas or lentils. Choose fish, skinless poultry (chicken or turkey), or lean cuts of red meat (beef or pork). Before you cook meat or poultry, cut off any visible fat.   Use less fat and oil.  Try baking foods instead of frying them. Add less fat, such as margarine, sour cream, regular salad dressing and mayonnaise to foods. Eat fewer high-fat foods. Some examples of high-fat foods include french fries, doughnuts, ice cream, and  cakes.  Eat fewer sweets.  Limit foods and drinks that are high in sugar. This includes candy, cookies, regular soda, and sweetened drinks.  Exercise:  Exercise at least 30 minutes per day on most days of the week. Some examples of exercise include walking, biking, dancing, and swimming. You can also fit in more physical activity by taking the stairs instead of the elevator or parking farther away from stores. Ask your healthcare provider about the best exercise plan for you.      © Copyright [employer] 2018 Information is for End User's use only and may not be sold, redistributed or otherwise used for commercial purposes. All illustrations and images included in CareNotes® are the copyrighted property of [employer] or [employer]

## 2025-01-27 NOTE — ASSESSMENT & PLAN NOTE
Continue current  diabetic regimen.  Continue to trend A1c.  Lab Results   Component Value Date    HGBA1C 7.5 (A) 01/27/2025     Orders:  •  POCT hemoglobin A1c  •  Albumin / creatinine urine ratio; Future  •  CBC and differential; Future  •  Comprehensive metabolic panel; Future  •  Hemoglobin A1C; Future  •  Lipid panel; Future

## 2025-01-27 NOTE — ASSESSMENT & PLAN NOTE
Calvin Egan and I had a thorough discussion regarding advance care planning.  he  has a Living Will at home to which I recommended he provide a copy to be scanned for his medical records.  ACP documentation provided to patient today.  he  understands that today's visit is a billable service.  Refer to ACP note.    Serious Illness Conversation    1. What is your understanding now of where you are with your illness?  Prognostic Understanding: appropriate understanding of prognosis     2. How much information about what is likely to be ahead with your illness would you like to have?  Information: patient wants to be fully informed     3. What did you (clinician) communicate to the patient?     4. If your health situation worsens, what are your most important goals?  Goals: have my medical decisions respected, be mentally aware, be at home, be physically comfortable, be emotionally at peace, be spiritually and emotionally at peace, not be a burden     5. What are the biggest fears and worries about the future and your health?  Fears/Worries: loss of control, being a financial burden, being a physical burden, burdening others, being dependent     6. What abilities are so critical to your life that you cannot imagine living without them?  Unacceptable Function: being unconscious     7. What gives you strength as you think about the future with your illness?     8. If you become sicker, how much are you willing to go through for the possibility of gaining more time?  Be in the hospital: Yes Have a feeding tube: No   Be in the ICU: Yes Live in a nursing home: Yes   Be on a ventilator: No Be uncomfortable: No   Be on dialysis: No Undergo aggressive test and/or procedures: No   9. How much does your proxy and family know about your priorities and wishes?  Discussion Discussion: extensive discussion with family about goals and wishes        How does this plan sound to you? I will do everything I can to help you through  this.  Patient verbalized understanding of the plan     I have spent 16 minutes speaking with my patient on advanced care planning today or during this visit     Advanced directives  Five Wishes: Patient does not have Five Wishes- would not like information

## 2025-01-27 NOTE — ASSESSMENT & PLAN NOTE
Lab Results   Component Value Date    EGFR 34 09/17/2024    EGFR 38 (L) 05/06/2024    EGFR 34 (L) 03/14/2024    CREATININE 1.71 (H) 09/17/2024    CREATININE 1.70 (H) 05/06/2024    CREATININE 1.88 (H) 03/14/2024

## 2025-01-27 NOTE — ASSESSMENT & PLAN NOTE
Controlled, Continue current antihypertensive regimen.   Orders:  •  Albumin / creatinine urine ratio; Future  •  CBC and differential; Future  •  Comprehensive metabolic panel; Future  •  Hemoglobin A1C; Future  •  Lipid panel; Future

## 2025-01-27 NOTE — ASSESSMENT & PLAN NOTE
Controlled.  Continue simvastatin 20 mg daily.  Orders:  •  Albumin / creatinine urine ratio; Future  •  CBC and differential; Future  •  Comprehensive metabolic panel; Future  •  Hemoglobin A1C; Future  •  Lipid panel; Future

## 2025-02-10 ENCOUNTER — APPOINTMENT (OUTPATIENT)
Dept: LAB | Facility: IMAGING CENTER | Age: 89
End: 2025-02-10
Payer: COMMERCIAL

## 2025-02-10 ENCOUNTER — OFFICE VISIT (OUTPATIENT)
Dept: INTERNAL MEDICINE CLINIC | Facility: OTHER | Age: 89
End: 2025-02-10
Payer: COMMERCIAL

## 2025-02-10 VITALS
HEART RATE: 66 BPM | BODY MASS INDEX: 27.72 KG/M2 | SYSTOLIC BLOOD PRESSURE: 130 MMHG | HEIGHT: 71 IN | WEIGHT: 198 LBS | OXYGEN SATURATION: 98 % | DIASTOLIC BLOOD PRESSURE: 80 MMHG | TEMPERATURE: 98.4 F

## 2025-02-10 DIAGNOSIS — E79.0 HYPERURICEMIA: ICD-10-CM

## 2025-02-10 DIAGNOSIS — R60.0 BILATERAL LOWER EXTREMITY EDEMA: Primary | ICD-10-CM

## 2025-02-10 DIAGNOSIS — R60.0 BILATERAL LOWER EXTREMITY EDEMA: ICD-10-CM

## 2025-02-10 PROBLEM — D68.9 COAGULOPATHY (HCC): Status: ACTIVE | Noted: 2025-02-10

## 2025-02-10 LAB
ANION GAP SERPL CALCULATED.3IONS-SCNC: 11 MMOL/L (ref 4–13)
BNP SERPL-MCNC: 60 PG/ML (ref 0–100)
BUN SERPL-MCNC: 22 MG/DL (ref 5–25)
CALCIUM SERPL-MCNC: 8.3 MG/DL (ref 8.4–10.2)
CHLORIDE SERPL-SCNC: 110 MMOL/L (ref 96–108)
CO2 SERPL-SCNC: 21 MMOL/L (ref 21–32)
CREAT SERPL-MCNC: 1.73 MG/DL (ref 0.6–1.3)
GFR SERPL CREATININE-BSD FRML MDRD: 34 ML/MIN/1.73SQ M
GLUCOSE SERPL-MCNC: 150 MG/DL (ref 65–140)
POTASSIUM SERPL-SCNC: 4.7 MMOL/L (ref 3.5–5.3)
SODIUM SERPL-SCNC: 142 MMOL/L (ref 135–147)
URATE SERPL-MCNC: 5.5 MG/DL (ref 3.5–8.5)

## 2025-02-10 PROCEDURE — 80048 BASIC METABOLIC PNL TOTAL CA: CPT

## 2025-02-10 PROCEDURE — 83880 ASSAY OF NATRIURETIC PEPTIDE: CPT

## 2025-02-10 PROCEDURE — G2211 COMPLEX E/M VISIT ADD ON: HCPCS | Performed by: INTERNAL MEDICINE

## 2025-02-10 PROCEDURE — 84550 ASSAY OF BLOOD/URIC ACID: CPT

## 2025-02-10 PROCEDURE — 36415 COLL VENOUS BLD VENIPUNCTURE: CPT

## 2025-02-10 PROCEDURE — 99213 OFFICE O/P EST LOW 20 MIN: CPT | Performed by: INTERNAL MEDICINE

## 2025-02-10 RX ORDER — FUROSEMIDE 20 MG/1
20 TABLET ORAL DAILY
Qty: 14 TABLET | Refills: 0 | Status: SHIPPED | OUTPATIENT
Start: 2025-02-10 | End: 2025-02-19

## 2025-02-10 NOTE — PROGRESS NOTES
Name: Calvin Egan      : 1935      MRN: [id number]  Encounter Provider: Cooper Barros MD  Encounter Date: 2/10/2025   Encounter department: [facility]  :  Assessment & Plan  Bilateral lower extremity edema  Will prescribe furosemide 20 mg daily.  Will check a BMP, uric acid level, CBC, BNP.  Will follow-up in 1 week.  Consider echocardiogram if BNP is elevated.  Orders:  •  Basic metabolic panel; Future  •  furosemide (LASIX) 20 mg tablet; Take 1 tablet (20 mg total) by mouth daily  •  Uric acid; Future  •  CBC and differential; Future  •  B-Type Natriuretic Peptide(BNP); Future    Hyperuricemia  Will check a BMP and uric acid level.  Orders:  •  Basic metabolic panel; Future  •  Uric acid; Future           History of Present Illness   Calvin Egan is seen today with concern for bilateral leg edema, right worse than the left.   He denies any SOB or DAWKINS.   Edema started 4 days ago. He has never experienced leg edema in the past.  He denies any calf or joint pain.  He denies any increase in salt intake or increase in weight.      Review of Systems   Constitutional: Negative.  Negative for activity change, appetite change, chills, diaphoresis, fatigue and fever.   HENT:  Negative for congestion, ear pain, postnasal drip, rhinorrhea, sinus pressure, sinus pain, sneezing and sore throat.    Eyes: Negative.  Negative for visual disturbance.   Respiratory:  Negative for apnea, cough, choking, chest tightness, shortness of breath and wheezing.    Cardiovascular:  Negative for chest pain, palpitations and leg swelling.   Gastrointestinal:  Negative for abdominal distention, abdominal pain, anal bleeding, blood in stool, constipation, diarrhea, nausea and vomiting.   Endocrine: Negative.  Negative for cold intolerance and heat intolerance.   Genitourinary:  Negative for difficulty urinating, dysuria and hematuria.   Musculoskeletal: Negative.    Skin: Negative.   "  Allergic/Immunologic: Negative for environmental allergies and food allergies.   Neurological: Negative.  Negative for dizziness, weakness, light-headedness, numbness and headaches.   Hematological:  Negative for adenopathy.   Psychiatric/Behavioral:  Negative for agitation, behavioral problems, confusion, sleep disturbance and suicidal ideas.    All other systems reviewed and are negative.      Objective   /80 (BP Location: Left arm, Patient Position: Sitting, Cuff Size: Adult)   Pulse 66   Temp 98.4 °F (36.9 °C)   Ht 5' 11\" (1.803 m)   Wt 89.8 kg (198 lb)   SpO2 98%   BMI 27.62 kg/m²      Physical Exam  Vitals and nursing note reviewed.   Constitutional:       General: He is not in acute distress.     Appearance: He is well-developed. He is not diaphoretic.   HENT:      Head: Normocephalic and atraumatic.   Eyes:      General: No scleral icterus.        Right eye: No discharge.         Left eye: No discharge.      Conjunctiva/sclera: Conjunctivae normal.      Pupils: Pupils are equal, round, and reactive to light.   Neck:      Thyroid: No thyromegaly.      Vascular: No JVD.   Cardiovascular:      Rate and Rhythm: Normal rate and regular rhythm.      Heart sounds: Normal heart sounds. No murmur heard.     No friction rub. No gallop.   Pulmonary:      Effort: Pulmonary effort is normal. No respiratory distress.      Breath sounds: Normal breath sounds. No wheezing or rales.   Chest:      Chest wall: No tenderness.   Abdominal:      General: Bowel sounds are normal. There is no distension.      Palpations: Abdomen is soft. There is no mass.      Tenderness: There is no abdominal tenderness. There is no guarding or rebound.   Musculoskeletal:         General: No tenderness or deformity. Normal range of motion.      Cervical back: Normal range of motion and neck supple.      Right lower le+ Pitting Edema present.      Left lower le+ Pitting Edema present.   Lymphadenopathy:      Cervical: No " cervical adenopathy.   Skin:     General: Skin is warm and dry.      Coloration: Skin is not pale.      Findings: No erythema or rash.   Neurological:      Mental Status: He is alert and oriented to person, place, and time.      Cranial Nerves: No cranial nerve deficit.      Coordination: Coordination normal.      Deep Tendon Reflexes: Reflexes are normal and symmetric.   Psychiatric:         Behavior: Behavior normal.         Thought Content: Thought content normal.         Judgment: Judgment normal.

## 2025-02-10 NOTE — ASSESSMENT & PLAN NOTE
Will check a BMP and uric acid level.  Orders:  •  Basic metabolic panel; Future  •  Uric acid; Future

## 2025-02-10 NOTE — ASSESSMENT & PLAN NOTE
Will prescribe furosemide 20 mg daily.  Will check a BMP, uric acid level, CBC, BNP.  Will follow-up in 1 week.  Consider echocardiogram if BNP is elevated.  Orders:  •  Basic metabolic panel; Future  •  furosemide (LASIX) 20 mg tablet; Take 1 tablet (20 mg total) by mouth daily  •  Uric acid; Future  •  CBC and differential; Future  •  B-Type Natriuretic Peptide(BNP); Future

## 2025-02-19 ENCOUNTER — OFFICE VISIT (OUTPATIENT)
Dept: INTERNAL MEDICINE CLINIC | Facility: OTHER | Age: 89
End: 2025-02-19
Payer: COMMERCIAL

## 2025-02-19 VITALS
HEART RATE: 74 BPM | SYSTOLIC BLOOD PRESSURE: 130 MMHG | OXYGEN SATURATION: 98 % | HEIGHT: 71 IN | BODY MASS INDEX: 27.44 KG/M2 | WEIGHT: 196 LBS | DIASTOLIC BLOOD PRESSURE: 80 MMHG | TEMPERATURE: 97.2 F

## 2025-02-19 DIAGNOSIS — Z13.228 SCREENING FOR METABOLIC DISORDER: ICD-10-CM

## 2025-02-19 DIAGNOSIS — I10 ESSENTIAL HYPERTENSION: ICD-10-CM

## 2025-02-19 DIAGNOSIS — R60.0 BILATERAL LOWER EXTREMITY EDEMA: Primary | ICD-10-CM

## 2025-02-19 PROCEDURE — G2211 COMPLEX E/M VISIT ADD ON: HCPCS

## 2025-02-19 PROCEDURE — 99213 OFFICE O/P EST LOW 20 MIN: CPT

## 2025-02-19 RX ORDER — FUROSEMIDE 20 MG/1
20 TABLET ORAL 2 TIMES DAILY
Qty: 14 TABLET | Refills: 0 | Status: SHIPPED | OUTPATIENT
Start: 2025-02-19 | End: 2025-02-19

## 2025-02-19 RX ORDER — FUROSEMIDE 20 MG/1
20 TABLET ORAL 2 TIMES DAILY
Qty: 14 TABLET | Refills: 0 | Status: SHIPPED | OUTPATIENT
Start: 2025-02-19

## 2025-02-19 NOTE — PROGRESS NOTES
Diabetic Foot Exam    Patient's shoes and socks removed.    Right Foot/Ankle   Right Foot Inspection  Skin Exam: skin normal and skin intact. No dry skin, no warmth, no callus, no erythema, no maceration, no abnormal color, no pre-ulcer, no ulcer and no callus.     Sensory   Monofilament testing: diminished    Left Foot/Ankle  Left Foot Inspection  Skin Exam: skin normal and skin intact. No dry skin, no warmth, no erythema, no maceration, normal color, no pre-ulcer, no ulcer and no callus.     Sensory   Monofilament testing: intact    Assign Risk Category  {Diabetic Foot Exam Documentation Incomplete}

## 2025-02-19 NOTE — PROGRESS NOTES
Name: Calvin Egan      : 1935      MRN: [id number]  Encounter Provider: Esme Vilchis PA-C  Encounter Date: 2025   Encounter department: [facility]  :  Assessment & Plan  Bilateral lower extremity edema  No improvement with Lasix 20 mg p.o. increased to 20 mg twice daily with breakfast and lunch  Potassium 4.7, will defer potassium supplementation for now  Recommend compression stockings, low-salt diet, elevating feet while resting  Will update echocardiogram, BNP WNL  Repeat BMP Monday or Tuesday  Follow-up 1 week  Orders:    Echo complete w/ contrast if indicated; Future    Basic metabolic panel    Compression Stocking    furosemide (LASIX) 20 mg tablet; Take 1 tablet (20 mg total) by mouth 2 (two) times a day With breakfast and lunch    Screening for metabolic disorder         Essential hypertension  Controlled, Continue current antihypertensive regimen.                 History of Present Illness   Patient is an 81-year-old male presenting to the office for 1 week follow-up  He was seen on 2/10/2025 for bilateral lower extremity edema  Was advised to take furosemide 20mg daily, no side effects for medication  Patient notes no changes to leg swelling  Denies chest pain or SOB  He has tried some leg elevation, denies compression stocking use  He has 2lb weight loss since last visit       Labs reviewed with patient  BMP: Creatinine 1.73, at baseline, GFR 34  Sodium and potassium WNL  Uric acid WNL at 5.5              Review of Systems   Constitutional:  Negative for chills, fatigue and fever.   HENT:  Negative for congestion, ear pain, postnasal drip, rhinorrhea, sinus pressure, sore throat and trouble swallowing.    Eyes:  Negative for pain and visual disturbance.   Respiratory:  Negative for cough, chest tightness, shortness of breath and wheezing.    Cardiovascular:  Positive for leg swelling. Negative for chest pain and palpitations.   Gastrointestinal:   "Negative for abdominal pain, diarrhea, nausea and vomiting.   Genitourinary:  Negative for difficulty urinating, dysuria and hematuria.   Skin:  Negative for color change.   Neurological:  Negative for dizziness, weakness, light-headedness, numbness and headaches.   All other systems reviewed and are negative.      Objective   /80 (BP Location: Left arm, Patient Position: Sitting, Cuff Size: Standard)   Pulse 74   Temp (!) 97.2 °F (36.2 °C) (Temporal)   Ht 5' 11\" (1.803 m)   Wt 88.9 kg (196 lb)   SpO2 98%   BMI 27.34 kg/m²      Physical Exam  Vitals and nursing note reviewed.   Constitutional:       General: He is not in acute distress.     Appearance: Normal appearance. He is not ill-appearing.   HENT:      Head: Normocephalic and atraumatic.      Right Ear: External ear normal.      Left Ear: External ear normal.      Nose: Nose normal.      Mouth/Throat:      Mouth: Mucous membranes are moist.      Pharynx: Oropharynx is clear.   Eyes:      General: No scleral icterus.     Conjunctiva/sclera: Conjunctivae normal.   Cardiovascular:      Rate and Rhythm: Normal rate and regular rhythm.      Heart sounds: Normal heart sounds. No murmur heard.     No friction rub. No gallop.   Pulmonary:      Effort: Pulmonary effort is normal. No respiratory distress.      Breath sounds: Normal breath sounds. No wheezing, rhonchi or rales.   Chest:      Chest wall: No tenderness.   Musculoskeletal:      Right lower le+ Pitting Edema present.      Left lower le+ Pitting Edema present.   Skin:     General: Skin is warm and dry.      Comments: Venous stasis skin changes noted to b/l LE   Neurological:      General: No focal deficit present.      Mental Status: He is alert and oriented to person, place, and time. Mental status is at baseline.   Psychiatric:         Mood and Affect: Mood normal.         Behavior: Behavior normal.           Disclaimer: This note was generated with voice recognition software.  Phonetic, " grammatical, and spelling errors may be present as a result.  Please contact provider with any concerns or questions

## 2025-02-19 NOTE — ASSESSMENT & PLAN NOTE
No improvement with Lasix 20 mg p.o. increased to 20 mg twice daily with breakfast and lunch  Potassium 4.7, will defer potassium supplementation for now  Recommend compression stockings, low-salt diet, elevating feet while resting  Will update echocardiogram, BNP WNL  Repeat BMP Monday or Tuesday  Follow-up 1 week  Orders:    Echo complete w/ contrast if indicated; Future    Basic metabolic panel    Compression Stocking    furosemide (LASIX) 20 mg tablet; Take 1 tablet (20 mg total) by mouth 2 (two) times a day With breakfast and lunch

## 2025-02-25 ENCOUNTER — RA CDI HCC (OUTPATIENT)
Dept: OTHER | Facility: HOSPITAL | Age: 89
End: 2025-02-25

## 2025-02-27 ENCOUNTER — OFFICE VISIT (OUTPATIENT)
Dept: INTERNAL MEDICINE CLINIC | Facility: OTHER | Age: 89
End: 2025-02-27
Payer: COMMERCIAL

## 2025-02-27 ENCOUNTER — APPOINTMENT (OUTPATIENT)
Dept: LAB | Facility: IMAGING CENTER | Age: 89
End: 2025-02-27
Payer: COMMERCIAL

## 2025-02-27 ENCOUNTER — TELEPHONE (OUTPATIENT)
Dept: ADMINISTRATIVE | Facility: OTHER | Age: 89
End: 2025-02-27

## 2025-02-27 VITALS
BODY MASS INDEX: 27.16 KG/M2 | OXYGEN SATURATION: 97 % | WEIGHT: 194 LBS | HEART RATE: 64 BPM | HEIGHT: 71 IN | DIASTOLIC BLOOD PRESSURE: 72 MMHG | SYSTOLIC BLOOD PRESSURE: 140 MMHG | TEMPERATURE: 97.2 F

## 2025-02-27 DIAGNOSIS — R60.0 BILATERAL LOWER EXTREMITY EDEMA: Primary | ICD-10-CM

## 2025-02-27 DIAGNOSIS — Z13.228 SCREENING FOR METABOLIC DISORDER: ICD-10-CM

## 2025-02-27 DIAGNOSIS — I10 ESSENTIAL HYPERTENSION: ICD-10-CM

## 2025-02-27 DIAGNOSIS — L85.3 DRY SKIN: ICD-10-CM

## 2025-02-27 LAB
ANION GAP SERPL CALCULATED.3IONS-SCNC: 12 MMOL/L (ref 4–13)
BUN SERPL-MCNC: 35 MG/DL (ref 5–25)
CALCIUM SERPL-MCNC: 9.6 MG/DL (ref 8.4–10.2)
CHLORIDE SERPL-SCNC: 104 MMOL/L (ref 96–108)
CO2 SERPL-SCNC: 25 MMOL/L (ref 21–32)
CREAT SERPL-MCNC: 1.99 MG/DL (ref 0.6–1.3)
GFR SERPL CREATININE-BSD FRML MDRD: 29 ML/MIN/1.73SQ M
GLUCOSE P FAST SERPL-MCNC: 156 MG/DL (ref 65–99)
POTASSIUM SERPL-SCNC: 4.3 MMOL/L (ref 3.5–5.3)
SODIUM SERPL-SCNC: 141 MMOL/L (ref 135–147)

## 2025-02-27 PROCEDURE — G2211 COMPLEX E/M VISIT ADD ON: HCPCS

## 2025-02-27 PROCEDURE — 36415 COLL VENOUS BLD VENIPUNCTURE: CPT

## 2025-02-27 PROCEDURE — 99213 OFFICE O/P EST LOW 20 MIN: CPT

## 2025-02-27 PROCEDURE — 80048 BASIC METABOLIC PNL TOTAL CA: CPT

## 2025-02-27 RX ORDER — CERAMIDE 1,3,6-II/SALICYLIC/B3
1 CLEANSER (ML) TOPICAL 3 TIMES DAILY
Qty: 453 G | Refills: 0 | Status: SHIPPED | OUTPATIENT
Start: 2025-02-27

## 2025-02-27 RX ORDER — MIRTAZAPINE 30 MG/1
30 TABLET, FILM COATED ORAL
COMMUNITY
Start: 2024-12-31

## 2025-02-27 RX ORDER — FUROSEMIDE 20 MG/1
20 TABLET ORAL 2 TIMES DAILY
Qty: 30 TABLET | Refills: 0 | Status: SHIPPED | OUTPATIENT
Start: 2025-02-27 | End: 2025-03-04 | Stop reason: ALTCHOICE

## 2025-02-27 NOTE — PROGRESS NOTES
Diabetic Foot Exam    Patient's shoes and socks removed.    Right Foot/Ankle   Right Foot Inspection  Skin Exam: skin normal, skin intact and dry skin. No warmth, no callus, no erythema, no maceration, no abnormal color, no pre-ulcer, no ulcer and no callus.     Toe Exam: ROM and strength within normal limits and swelling.     Sensory   Monofilament testing: diminished    Left Foot/Ankle  Left Foot Inspection  Skin Exam: skin normal, skin intact and dry skin. No warmth, no erythema, no maceration, normal color, no pre-ulcer, no ulcer and no callus.     Toe Exam: ROM and strength within normal limits and swelling.     Sensory   Monofilament testing: diminished    Assign Risk Category  {Diabetic Foot Exam Documentation Incomplete}

## 2025-02-27 NOTE — ASSESSMENT & PLAN NOTE
Patient noncompliant with Lasix doses previously prescribed, as he states he lost the medication  Will send a prescription for Lasix 20 mg twice daily to be taken with breakfast and lunch  Potassium 4.7, will defer potassium supplementation for now  Repeat BMP in process, not available at the time of this visit  Recommend compression stockings, low-salt diet, elevating feet while resting  Will update echocardiogram, scheduled for tomorrow  Repeat BMP Monday or Tuesday  Follow-up 1 week      Orders:    furosemide (LASIX) 20 mg tablet; Take 1 tablet (20 mg total) by mouth 2 (two) times a day With breakfast and lunch    Basic metabolic panel     No

## 2025-02-27 NOTE — LETTER
Diabetic Eye Exam Form    Date Requested: 25  Patient: Calvin Egan  Patient : 1935   Referring Provider: Cooper Barros MD      DIABETIC Eye Exam Date _______________________________      Type of Exam MUST be documented for Diabetic Eye Exams. Please CHECK ONE.     Retinal Exam       Dilated Retinal Exam       OCT       Optomap-Iris Exam      Fundus Photography       Left Eye - Please check Retinopathy or No Retinopathy        Exam did show retinopathy    Exam did not show retinopathy       Right Eye - Please check Retinopathy or No Retinopathy       Exam did show retinopathy    Exam did not show retinopathy       Comments __________________________________________________________    Practice Providing Exam ______________________________________________    Exam Performed By (print name) _______________________________________      Provider Signature ___________________________________________________      These reports are needed for  compliance.  Please fax this completed form and a copy of the Diabetic Eye Exam report to our office located at [address] [place] [address] as soon as possible via Fax [phone] attention Carlin: Phone [phone]  We thank you for your assistance in treating our mutual patient.

## 2025-02-27 NOTE — TELEPHONE ENCOUNTER
----- Message from Marta GEE sent at 2/27/2025 11:18 AM EST -----  Regarding: Care Gap Request  02/27/25 11:18 AM    Hello, our patient attached above has had Diabetic Eye Exam completed/performed. Please assist in updating the patient chart by making an External outreach to DR. [facility] [place] facility located in [place], [place] . The date of service is 5108-9350.    Thank you,  Marta Nina [facility] [place]

## 2025-02-27 NOTE — PROGRESS NOTES
Name: Calvin Egan      : 1935      MRN: [id number]  Encounter Provider: Esme Vilchis PA-C  Encounter Date: 2025   Encounter department: [facility]  :  Assessment & Plan  Bilateral lower extremity edema  Patient noncompliant with Lasix doses previously prescribed, as he states he lost the medication  Will send a prescription for Lasix 20 mg twice daily to be taken with breakfast and lunch  Potassium 4.7, will defer potassium supplementation for now  Repeat BMP in process, not available at the time of this visit  Recommend compression stockings, low-salt diet, elevating feet while resting  Will update echocardiogram, scheduled for tomorrow  Repeat BMP Monday or Tuesday  Follow-up 1 week      Orders:    furosemide (LASIX) 20 mg tablet; Take 1 tablet (20 mg total) by mouth 2 (two) times a day With breakfast and lunch    Basic metabolic panel    Screening for metabolic disorder         Dry skin  And topical lotion such as CeraVe 2-3 times daily.  Avoid cracking the skin as it increases risk for cellulitis  Orders:    Emollient (CeraVe Moisturizing) CREA; Apply 1 Application topically 3 (three) times a day    Essential hypertension  Continue current antihypertensive regimen.  May improve slightly with Lasix                  History of Present Illness   Patient is an 81-year-old male presenting to the office for 1 week follow-up  He was seen on 2/10/2025 and 25 for bilateral lower extremity edema  At last visit, was recommended to increase Lasix to 20 mg 2 times daily due to no improvement with Lasix 20 mg daily  Patient reports he was not taking medication as prescribed, as he lost the medication.  States he took approximately 5 pills this past week  Denies chest pain or SOB  He has tried some leg elevation, denies compression stocking use  He has 2lb weight loss since last visit      Repeat BMP still in process            Review of Systems   Constitutional:   "Negative for chills, fatigue and fever.   HENT:  Negative for congestion, ear pain, postnasal drip, rhinorrhea, sinus pressure, sore throat and trouble swallowing.    Eyes:  Negative for pain and visual disturbance.   Respiratory:  Negative for cough, chest tightness, shortness of breath and wheezing.    Cardiovascular:  Positive for leg swelling. Negative for chest pain and palpitations.   Gastrointestinal:  Negative for abdominal pain, blood in stool, constipation, diarrhea, nausea and vomiting.   Neurological:  Negative for dizziness, weakness, light-headedness, numbness and headaches.   Psychiatric/Behavioral:  Negative for dysphoric mood and sleep disturbance. The patient is not nervous/anxious.    All other systems reviewed and are negative.      Objective   /72 (BP Location: Left arm, Patient Position: Sitting, Cuff Size: Standard)   Pulse 64   Temp (!) 97.2 °F (36.2 °C) (Temporal)   Ht 5' 11\" (1.803 m)   Wt 88 kg (194 lb)   SpO2 97%   BMI 27.06 kg/m²      Physical Exam  Vitals and nursing note reviewed.   Constitutional:       General: He is not in acute distress.     Appearance: Normal appearance. He is not ill-appearing.   HENT:      Head: Normocephalic and atraumatic.      Right Ear: External ear normal.      Left Ear: External ear normal.      Nose: Nose normal.      Mouth/Throat:      Mouth: Mucous membranes are moist.      Pharynx: Oropharynx is clear.   Eyes:      General: No scleral icterus.     Conjunctiva/sclera: Conjunctivae normal.   Cardiovascular:      Rate and Rhythm: Normal rate and regular rhythm.      Heart sounds: Normal heart sounds. No murmur heard.     No friction rub. No gallop.   Pulmonary:      Effort: Pulmonary effort is normal. No respiratory distress.      Breath sounds: Normal breath sounds. No wheezing, rhonchi or rales.   Chest:      Chest wall: No tenderness.   Musculoskeletal:      Right lower le+ Pitting Edema present.      Left lower le+ Pitting Edema " present.   Skin:     General: Skin is warm and dry.      Coloration: Skin is not pale.      Findings: No erythema.   Neurological:      General: No focal deficit present.      Mental Status: He is alert and oriented to person, place, and time. Mental status is at baseline.   Psychiatric:         Mood and Affect: Mood normal.         Behavior: Behavior normal.           Disclaimer: This note was generated with voice recognition software.  Phonetic, grammatical, and spelling errors may be present as a result.  Please contact provider with any concerns or questions

## 2025-02-28 ENCOUNTER — HOSPITAL ENCOUNTER (OUTPATIENT)
Dept: NON INVASIVE DIAGNOSTICS | Facility: HOSPITAL | Age: 89
Discharge: HOME | End: 2025-02-28
Payer: COMMERCIAL

## 2025-02-28 VITALS
SYSTOLIC BLOOD PRESSURE: 140 MMHG | HEART RATE: 60 BPM | BODY MASS INDEX: 27.16 KG/M2 | HEIGHT: 71 IN | WEIGHT: 194 LBS | DIASTOLIC BLOOD PRESSURE: 72 MMHG

## 2025-02-28 DIAGNOSIS — R60.0 BILATERAL LOWER EXTREMITY EDEMA: ICD-10-CM

## 2025-02-28 LAB
AORTIC ROOT: 2.8 CM
AORTIC VALVE MEAN VELOCITY: 10.3 M/S
AV LVOT MEAN GRADIENT: 1 MMHG
AV MEAN PRESS GRAD SYS DOP V1V2: 5 MMHG
AV VELOCITY RATIO: 0.56
BSA FOR ECHO PROCEDURE: 2.08 M2
DOP CALC AO VTI: 31.9 CM
DOP CALC LVOT PEAK VEL VTI: 17.9 CM
DOP CALC MV VTI: 28.6 CM
E WAVE DECELERATION TIME: 220 MS
E/A RATIO: 0.64
FRACTIONAL SHORTENING: 33 (ref 28–44)
INTERVENTRICULAR SEPTUM IN DIASTOLE (PARASTERNAL SHORT AXIS VIEW): 1.1 CM
INTERVENTRICULAR SEPTUM: 1.1 CM (ref 0.6–1.1)
LA/AORTA RATIO 2D: 1.29
LEFT ATRIUM SIZE: 3.6 CM
LEFT INTERNAL DIMENSION IN SYSTOLE: 3.4 CM (ref 2.1–4)
LEFT VENTRICULAR INTERNAL DIMENSION IN DIASTOLE: 5.1 CM (ref 3.5–6)
LEFT VENTRICULAR POSTERIOR WALL IN END DIASTOLE: 1.5 CM
LEFT VENTRICULAR STROKE VOLUME: 77 ML
LVSV (TEICH): 77 ML
MV MEAN GRADIENT: 2 MMHG
MV PEAK A VEL: 1.05 M/S
MV PEAK E VEL: 67 CM/S
MV PEAK GRADIENT: 4 MMHG
MV STENOSIS PRESSURE HALF TIME: 64 MS
MV VALVE AREA P 1/2 METHOD: 3.44
RIGHT VENTRICLE ID DIMENSION: 3.7 CM
SL CV LV EF: 55
SL CV PED ECHO LEFT VENTRICLE DIASTOLIC VOLUME (MOD BIPLANE) 2D: 124 ML
SL CV PED ECHO LEFT VENTRICLE SYSTOLIC VOLUME (MOD BIPLANE) 2D: 47 ML
TR MAX PG: 27 MMHG
TR PEAK VELOCITY: 2.6 M/S
TRICUSPID VALVE PEAK REGURGITATION VELOCITY: 2.59 M/S

## 2025-02-28 PROCEDURE — 93306 TTE W/DOPPLER COMPLETE: CPT | Performed by: INTERNAL MEDICINE

## 2025-02-28 PROCEDURE — 93306 TTE W/DOPPLER COMPLETE: CPT

## 2025-02-28 NOTE — TELEPHONE ENCOUNTER
Upon review of the In Basket request we were able to locate, review, and update the patient chart as requested for Diabetic Eye Exam.    Any additional questions or concerns should be emailed to the Practice Liaisons via the appropriate education email address, please do not reply via In Basket.    Thank you  Carlin Sutherland MA   [facility]

## 2025-03-04 ENCOUNTER — OFFICE VISIT (OUTPATIENT)
Dept: INTERNAL MEDICINE CLINIC | Facility: OTHER | Age: 89
End: 2025-03-04
Payer: COMMERCIAL

## 2025-03-04 ENCOUNTER — TELEPHONE (OUTPATIENT)
Age: 89
End: 2025-03-04

## 2025-03-04 ENCOUNTER — RESULTS FOLLOW-UP (OUTPATIENT)
Dept: INTERNAL MEDICINE CLINIC | Facility: OTHER | Age: 89
End: 2025-03-04

## 2025-03-04 ENCOUNTER — APPOINTMENT (OUTPATIENT)
Dept: LAB | Facility: IMAGING CENTER | Age: 89
End: 2025-03-04
Payer: COMMERCIAL

## 2025-03-04 VITALS
HEART RATE: 59 BPM | BODY MASS INDEX: 26.74 KG/M2 | HEIGHT: 71 IN | OXYGEN SATURATION: 96 % | TEMPERATURE: 97.8 F | SYSTOLIC BLOOD PRESSURE: 122 MMHG | WEIGHT: 191 LBS | DIASTOLIC BLOOD PRESSURE: 60 MMHG

## 2025-03-04 DIAGNOSIS — I48.91 ATRIAL FIBRILLATION, UNSPECIFIED TYPE (HCC): ICD-10-CM

## 2025-03-04 DIAGNOSIS — I10 ESSENTIAL HYPERTENSION: ICD-10-CM

## 2025-03-04 DIAGNOSIS — E78.2 MIXED HYPERLIPIDEMIA: ICD-10-CM

## 2025-03-04 DIAGNOSIS — E11.9 TYPE 2 DIABETES MELLITUS WITHOUT COMPLICATION, WITHOUT LONG-TERM CURRENT USE OF INSULIN (HCC): ICD-10-CM

## 2025-03-04 DIAGNOSIS — R60.0 BILATERAL LOWER EXTREMITY EDEMA: ICD-10-CM

## 2025-03-04 DIAGNOSIS — I10 ESSENTIAL HYPERTENSION: Primary | ICD-10-CM

## 2025-03-04 LAB
ANION GAP SERPL CALCULATED.3IONS-SCNC: 13 MMOL/L (ref 4–13)
BUN SERPL-MCNC: 37 MG/DL (ref 5–25)
CALCIUM SERPL-MCNC: 9.7 MG/DL (ref 8.4–10.2)
CHLORIDE SERPL-SCNC: 102 MMOL/L (ref 96–108)
CO2 SERPL-SCNC: 27 MMOL/L (ref 21–32)
CREAT SERPL-MCNC: 2.06 MG/DL (ref 0.6–1.3)
CREAT UR-MCNC: 45.4 MG/DL
GFR SERPL CREATININE-BSD FRML MDRD: 27 ML/MIN/1.73SQ M
GLUCOSE P FAST SERPL-MCNC: 153 MG/DL (ref 65–99)
MICROALBUMIN UR-MCNC: 27 MG/L
MICROALBUMIN/CREAT 24H UR: 59 MG/G CREATININE (ref 0–30)
POTASSIUM SERPL-SCNC: 4.2 MMOL/L (ref 3.5–5.3)
SODIUM SERPL-SCNC: 142 MMOL/L (ref 135–147)

## 2025-03-04 PROCEDURE — 82570 ASSAY OF URINE CREATININE: CPT

## 2025-03-04 PROCEDURE — 36415 COLL VENOUS BLD VENIPUNCTURE: CPT

## 2025-03-04 PROCEDURE — 99214 OFFICE O/P EST MOD 30 MIN: CPT

## 2025-03-04 PROCEDURE — 82043 UR ALBUMIN QUANTITATIVE: CPT

## 2025-03-04 PROCEDURE — 80048 BASIC METABOLIC PNL TOTAL CA: CPT

## 2025-03-04 PROCEDURE — G2211 COMPLEX E/M VISIT ADD ON: HCPCS

## 2025-03-04 RX ORDER — AMLODIPINE BESYLATE 10 MG/1
5 TABLET ORAL DAILY
Qty: 30 TABLET | Refills: 0 | Status: SHIPPED | OUTPATIENT
Start: 2025-03-04 | End: 2025-03-04

## 2025-03-04 RX ORDER — ATORVASTATIN CALCIUM 20 MG/1
20 TABLET, FILM COATED ORAL DAILY
Qty: 30 TABLET | Refills: 0 | Status: SHIPPED | OUTPATIENT
Start: 2025-03-04

## 2025-03-04 RX ORDER — AMLODIPINE BESYLATE 5 MG/1
5 TABLET ORAL DAILY
Qty: 30 TABLET | Refills: 0 | Status: SHIPPED | OUTPATIENT
Start: 2025-03-04 | End: 2025-03-11

## 2025-03-04 NOTE — ASSESSMENT & PLAN NOTE
Patient with 3 pound weight loss since last visit  Continues with +2 pitting edema bilaterally, although legs overall appear to be decreased in size from last visit  Repeat BMP in process as of this morning  Will stop Lasix.  Decrease amlodipine to 5 mg daily as edema could be calcium channel blocker induced  Recommend compression stockings, low-salt diet, elevating feet while resting  Follow-up 1 week  Discussed with

## 2025-03-04 NOTE — ASSESSMENT & PLAN NOTE
Well-controlled today.  Decrease amlodipine to 5 mg daily due to concern for calcium channel blocker induced edema  For now, no addition of alternative antihypertensives.  Patient is unsure if he is currently taking lisinopril.  He will bring medication list to next visit  Follow-up 1 week    Orders:    amLODIPine (NORVASC) 5 mg tablet; Take 1 tablet (5 mg total) by mouth daily

## 2025-03-04 NOTE — PROGRESS NOTES
Name: Calvin Egan      : 1935      MRN: [id number]  Encounter Provider: Esme Vilchis PA-C  Encounter Date: 3/4/2025   Encounter department: [facility] PRIMARY [facility]  :  Assessment & Plan  Essential hypertension  Well-controlled today.  Decrease amlodipine to 5 mg daily due to concern for calcium channel blocker induced edema  For now, no addition of alternative antihypertensives.  Patient is unsure if he is currently taking lisinopril.  He will bring medication list to next visit  Follow-up 1 week    Orders:    amLODIPine (NORVASC) 5 mg tablet; Take 1 tablet (5 mg total) by mouth daily    Mixed hyperlipidemia  Currently on simvastatin, however explained interaction between amlodipine and simvastatin  Will switch simvastatin to atorvastatin    Orders:    atorvastatin (LIPITOR) 20 mg tablet; Take 1 tablet (20 mg total) by mouth daily    Bilateral lower extremity edema  Patient with 3 pound weight loss since last visit  Continues with +2 pitting edema bilaterally, although legs overall appear to be decreased in size from last visit  Repeat BMP in process as of this morning  Will stop Lasix.  Decrease amlodipine to 5 mg daily as edema could be calcium channel blocker induced  Recommend compression stockings, low-salt diet, elevating feet while resting  Follow-up 1 week  Discussed with          Atrial fibrillation, unspecified type (HCC)  Continue metoprolol tartrate and Eliquis anticoagulation.                History of Present Illness   Patient is an 89-year-old male presenting to the office for 1 week follow-up  He was seen on 2/10/2025, 25, 25 for bilateral lower extremity edema  At last visit he was started on Lasix 20 mg twice daily x 1 week  Denies chest pain or SOB  He has tried some leg elevation, denies compression stocking use  He has 3lb weight loss since last visit   Patient endorses that he has had to urinate very frequently, causing him to miss certain  "activities     Repeat BMP still in process  Echo:    Left Ventricle: Left ventricular cavity size is normal. Wall thickness is mildly increased. The left ventricular ejection fraction is 55-60% by visual estimation. Systolic function is normal. Although no diagnostic regional wall motion abnormality was identified, this possibility cannot be completely excluded on the basis of this study. Diastolic function is mildly abnormal, consistent with grade I (abnormal) relaxation.  ·  Right Ventricle: Right ventricular cavity size is normal. Systolic function is normal.  ·  Left Atrium: The atrium is normal in size.  ·  Right Atrium: The atrium is normal in size.  ·  Aortic Valve: There is mild regurgitation. There is aortic valve sclerosis.  ·  Tricuspid Valve: There is mild to moderate regurgitation.          Review of Systems   Constitutional:  Negative for chills, fatigue and fever.   HENT:  Negative for congestion, ear pain, postnasal drip, rhinorrhea, sinus pressure, sore throat and trouble swallowing.    Eyes:  Negative for pain and visual disturbance.   Respiratory:  Negative for cough, chest tightness, shortness of breath and wheezing.    Cardiovascular:  Positive for leg swelling. Negative for chest pain and palpitations.   Gastrointestinal:  Negative for abdominal pain, blood in stool, nausea and vomiting.   Genitourinary:  Positive for frequency (with Lasix). Negative for difficulty urinating, dysuria and hematuria.   Neurological:  Negative for dizziness, weakness, light-headedness, numbness and headaches.   All other systems reviewed and are negative.      Objective   /60 (BP Location: Left arm, Patient Position: Sitting, Cuff Size: Adult)   Pulse 59   Temp 97.8 °F (36.6 °C)   Ht 5' 11\" (1.803 m)   Wt 86.6 kg (191 lb)   SpO2 96%   BMI 26.64 kg/m²      Physical Exam  Vitals and nursing note reviewed.   Constitutional:       General: He is not in acute distress.     Appearance: Normal appearance. He " is not ill-appearing.   HENT:      Head: Normocephalic and atraumatic.      Right Ear: External ear normal.      Left Ear: External ear normal.      Nose: Nose normal.      Mouth/Throat:      Mouth: Mucous membranes are moist.      Pharynx: Oropharynx is clear.   Eyes:      General: No scleral icterus.     Conjunctiva/sclera: Conjunctivae normal.   Cardiovascular:      Rate and Rhythm: Normal rate and regular rhythm.      Heart sounds: Normal heart sounds. No murmur heard.     No friction rub. No gallop.   Pulmonary:      Effort: Pulmonary effort is normal. No respiratory distress.      Breath sounds: Normal breath sounds. No wheezing, rhonchi or rales.   Chest:      Chest wall: No tenderness.   Musculoskeletal:      Right lower le+ Pitting Edema present.      Left lower le+ Pitting Edema present.      Comments: Legs appear to be less edematous than in previous weeks   Skin:     General: Skin is warm and dry.      Coloration: Skin is not pale.      Findings: No erythema.   Neurological:      General: No focal deficit present.      Mental Status: He is alert and oriented to person, place, and time. Mental status is at baseline.      Gait: Gait abnormal (using walker).   Psychiatric:         Mood and Affect: Mood normal.         Behavior: Behavior normal.           Disclaimer: This note was generated with voice recognition software.  Phonetic, grammatical, and spelling errors may be present as a result.  Please contact provider with any concerns or questions

## 2025-03-04 NOTE — ASSESSMENT & PLAN NOTE
Currently on simvastatin, however explained interaction between amlodipine and simvastatin  Will switch simvastatin to atorvastatin    Orders:    atorvastatin (LIPITOR) 20 mg tablet; Take 1 tablet (20 mg total) by mouth daily

## 2025-03-10 ENCOUNTER — TELEPHONE (OUTPATIENT)
Dept: ADMINISTRATIVE | Facility: OTHER | Age: 89
End: 2025-03-10

## 2025-03-10 NOTE — TELEPHONE ENCOUNTER
03/10/25 2:27 PM    Patient contacted to bring Advance Directive, POLST, or Living Will document to next scheduled pcp visit.[facility] Department left message.    Thank you.  Chelly Soliz MA  [facility]

## 2025-03-10 NOTE — TELEPHONE ENCOUNTER
Upon review of the In Basket request we were able to locate, review, and update the patient chart as requested for Medicare AW.    Any additional questions or concerns should be emailed to the Practice Liaisons via the appropriate education email address, please do not reply via In Basket.    Thank you  Carlin Sutherland MA   [facility]

## 2025-03-10 NOTE — TELEPHONE ENCOUNTER
----- Message from Gallo MORTON sent at 3/10/2025  8:23 AM EDT -----  03/10/25 8:23 AM    Hello, our patient Calvin Egan has had Medicare AWV completed/performed. Please assist in updating the patient chart by pulling the Care Everywhere (CE) document. The date of service is 10/2024.     Thank you,  Gallo Saldana [facility] [place]

## 2025-03-11 ENCOUNTER — OFFICE VISIT (OUTPATIENT)
Dept: INTERNAL MEDICINE CLINIC | Facility: OTHER | Age: 89
End: 2025-03-11
Payer: COMMERCIAL

## 2025-03-11 VITALS
TEMPERATURE: 97.6 F | HEART RATE: 59 BPM | DIASTOLIC BLOOD PRESSURE: 60 MMHG | BODY MASS INDEX: 27.3 KG/M2 | SYSTOLIC BLOOD PRESSURE: 110 MMHG | WEIGHT: 195 LBS | HEIGHT: 71 IN | OXYGEN SATURATION: 97 %

## 2025-03-11 DIAGNOSIS — I48.91 ATRIAL FIBRILLATION, UNSPECIFIED TYPE (HCC): ICD-10-CM

## 2025-03-11 DIAGNOSIS — N18.32 STAGE 3B CHRONIC KIDNEY DISEASE (HCC): ICD-10-CM

## 2025-03-11 DIAGNOSIS — R60.0 BILATERAL LOWER EXTREMITY EDEMA: Primary | ICD-10-CM

## 2025-03-11 DIAGNOSIS — I10 ESSENTIAL HYPERTENSION: ICD-10-CM

## 2025-03-11 PROCEDURE — 99214 OFFICE O/P EST MOD 30 MIN: CPT

## 2025-03-11 RX ORDER — FUROSEMIDE 20 MG/1
20 TABLET ORAL DAILY
Qty: 5 TABLET | Refills: 0 | Status: SHIPPED | OUTPATIENT
Start: 2025-03-11 | End: 2025-03-11

## 2025-03-11 RX ORDER — FUROSEMIDE 20 MG/1
20 TABLET ORAL DAILY
Qty: 5 TABLET | Refills: 0 | Status: SHIPPED | OUTPATIENT
Start: 2025-03-11 | End: 2025-03-17 | Stop reason: SDUPTHER

## 2025-03-11 NOTE — PROGRESS NOTES
Name: Calvin Egan      : 1935      MRN: [id number]  Encounter Provider: Esme Vilchis PA-C  Encounter Date: 3/11/2025   Encounter department: [facility]  :  Assessment & Plan  Bilateral lower extremity edema  Discussed with Dr. Barros  Will discontinue amlodipine altogether  Restart Lasix 20 mg x 5 days.  Most recent BMP reviewed, no need for potassium supplementation at this time  Continue low-salt diet, elevating feet while resting  Recommend compression stockings, however patient finds them difficult to put on  Repeat BMP on Friday, follow-up in office on Monday  If any shortness of breath or difficulty breathing, proceed to ER immediately  Orders:    Basic metabolic panel    furosemide (LASIX) 20 mg tablet; Take 1 tablet (20 mg total) by mouth daily for 5 days    Atrial fibrillation, unspecified type (HCC)  Continue metoprolol tartrate and Eliquis anticoagulation.         Essential hypertension  Well-controlled today.  Stop amlodipine due to concern for calcium channel blocker induced edema  For now, no addition of alternative antihypertensives  Follow-up 1 week             Stage 3b chronic kidney disease (HCC)  Lab Results   Component Value Date    EGFR 27 2025    EGFR 29 2025    EGFR 34 02/10/2025    CREATININE 2.06 (H) 2025    CREATININE 1.99 (H) 2025    CREATININE 1.73 (H) 02/10/2025   Repeat BMP on Friday following initiation of Lasix                History of Present Illness   Patient is an 89-year-old male presenting to the office for 1 week follow-up  He is currently being seen for bilateral lower extremity edema  At last visit, Lasix was stopped due to minimal improvement in edema.  Amlodipine was decreased to 5 mg daily due to concern for CCB induced swelling  Patient reports that his edema has worsened  He has gained 4lbs since last visit   Denies shortness of breath or chest pain    ECHO 25  Left Ventricle: Left  "ventricular cavity size is normal. Wall thickness is mildly increased. The left ventricular ejection fraction is 55-60% by visual estimation. Systolic function is normal. Although no diagnostic regional wall motion abnormality was identified, this possibility cannot be completely excluded on the basis of this study. Diastolic function is mildly abnormal, consistent with grade I (abnormal) relaxation.  ·  Right Ventricle: Right ventricular cavity size is normal. Systolic function is normal.  ·  Left Atrium: The atrium is normal in size.  ·  Right Atrium: The atrium is normal in size.  ·  Aortic Valve: There is mild regurgitation. There is aortic valve sclerosis.  ·  Tricuspid Valve: There is mild to moderate regurgitation.              Review of Systems   Constitutional:  Negative for chills, fatigue and fever.   HENT:  Negative for congestion, ear pain, postnasal drip, rhinorrhea, sinus pressure, sore throat and trouble swallowing.    Eyes:  Negative for pain and visual disturbance.   Respiratory:  Negative for cough, chest tightness, shortness of breath and wheezing.    Cardiovascular:  Positive for leg swelling. Negative for chest pain and palpitations.   Gastrointestinal:  Negative for abdominal pain, blood in stool, constipation, diarrhea, nausea and vomiting.   Genitourinary:  Negative for difficulty urinating and dysuria.   Neurological:  Negative for dizziness, light-headedness, numbness and headaches.   All other systems reviewed and are negative.      Objective   /60 (BP Location: Right arm, Patient Position: Sitting, Cuff Size: Adult)   Pulse 59   Temp 97.6 °F (36.4 °C)   Ht 5' 11\" (1.803 m)   Wt 88.5 kg (195 lb)   SpO2 97%   BMI 27.20 kg/m²      Physical Exam  Vitals and nursing note reviewed.   Constitutional:       General: He is not in acute distress.     Appearance: Normal appearance. He is not ill-appearing.   HENT:      Head: Normocephalic and atraumatic.      Right Ear: External ear " normal.      Left Ear: External ear normal.      Nose: Nose normal.      Mouth/Throat:      Mouth: Mucous membranes are moist.      Pharynx: Oropharynx is clear.   Eyes:      General: No scleral icterus.     Conjunctiva/sclera: Conjunctivae normal.   Cardiovascular:      Rate and Rhythm: Normal rate and regular rhythm.      Heart sounds: Normal heart sounds. No murmur heard.     No friction rub. No gallop.   Pulmonary:      Effort: Pulmonary effort is normal. No respiratory distress.      Breath sounds: Normal breath sounds. No wheezing, rhonchi or rales.   Chest:      Chest wall: No tenderness.   Musculoskeletal:      Right lower leg: 3+ Pitting Edema present.      Left lower leg: 3+ Pitting Edema present.   Skin:     General: Skin is warm and dry.      Comments: Chronic skin discoloration to b/l LE   Neurological:      General: No focal deficit present.      Mental Status: He is alert and oriented to person, place, and time. Mental status is at baseline.   Psychiatric:         Mood and Affect: Mood normal.         Behavior: Behavior normal.         Disclaimer: This note was generated with voice recognition software.  Phonetic, grammatical, and spelling errors may be present as a result.  Please contact provider with any concerns or questions

## 2025-03-11 NOTE — ASSESSMENT & PLAN NOTE
Discussed with Dr. Barros  Will discontinue amlodipine altogether  Restart Lasix 20 mg x 5 days.  Most recent BMP reviewed, no need for potassium supplementation at this time  Continue low-salt diet, elevating feet while resting  Recommend compression stockings, however patient finds them difficult to put on  Repeat BMP on Friday, follow-up in office on Monday  If any shortness of breath or difficulty breathing, proceed to ER immediately  Orders:    Basic metabolic panel    furosemide (LASIX) 20 mg tablet; Take 1 tablet (20 mg total) by mouth daily for 5 days

## 2025-03-11 NOTE — ASSESSMENT & PLAN NOTE
Well-controlled today.  Stop amlodipine due to concern for calcium channel blocker induced edema  For now, no addition of alternative antihypertensives  Follow-up 1 week

## 2025-03-14 ENCOUNTER — RESULTS FOLLOW-UP (OUTPATIENT)
Age: 89
End: 2025-03-14

## 2025-03-14 ENCOUNTER — APPOINTMENT (OUTPATIENT)
Dept: LAB | Facility: IMAGING CENTER | Age: 89
End: 2025-03-14
Payer: COMMERCIAL

## 2025-03-14 LAB
ANION GAP SERPL CALCULATED.3IONS-SCNC: 12 MMOL/L (ref 4–13)
BUN SERPL-MCNC: 33 MG/DL (ref 5–25)
CALCIUM SERPL-MCNC: 9.3 MG/DL (ref 8.4–10.2)
CHLORIDE SERPL-SCNC: 104 MMOL/L (ref 96–108)
CO2 SERPL-SCNC: 24 MMOL/L (ref 21–32)
CREAT SERPL-MCNC: 1.8 MG/DL (ref 0.6–1.3)
GFR SERPL CREATININE-BSD FRML MDRD: 32 ML/MIN/1.73SQ M
GLUCOSE P FAST SERPL-MCNC: 113 MG/DL (ref 65–99)
POTASSIUM SERPL-SCNC: 4 MMOL/L (ref 3.5–5.3)
SODIUM SERPL-SCNC: 140 MMOL/L (ref 135–147)

## 2025-03-14 PROCEDURE — 36415 COLL VENOUS BLD VENIPUNCTURE: CPT

## 2025-03-14 PROCEDURE — 80048 BASIC METABOLIC PNL TOTAL CA: CPT

## 2025-03-17 ENCOUNTER — OFFICE VISIT (OUTPATIENT)
Dept: INTERNAL MEDICINE CLINIC | Facility: OTHER | Age: 89
End: 2025-03-17
Payer: COMMERCIAL

## 2025-03-17 VITALS
BODY MASS INDEX: 26.04 KG/M2 | DIASTOLIC BLOOD PRESSURE: 74 MMHG | OXYGEN SATURATION: 98 % | WEIGHT: 186 LBS | TEMPERATURE: 98 F | SYSTOLIC BLOOD PRESSURE: 122 MMHG | HEIGHT: 71 IN | HEART RATE: 76 BPM

## 2025-03-17 DIAGNOSIS — R60.0 BILATERAL LOWER EXTREMITY EDEMA: Primary | ICD-10-CM

## 2025-03-17 PROCEDURE — 99213 OFFICE O/P EST LOW 20 MIN: CPT | Performed by: INTERNAL MEDICINE

## 2025-03-17 RX ORDER — FUROSEMIDE 20 MG/1
20 TABLET ORAL DAILY
Qty: 90 TABLET | Refills: 0 | Status: SHIPPED | OUTPATIENT
Start: 2025-03-17 | End: 2025-03-20 | Stop reason: SDUPTHER

## 2025-03-17 NOTE — ASSESSMENT & PLAN NOTE
-Patient presenting as follow up from 3/11 appointment for leg swelling.  -S/P Lasix 20 mg for 5 days.  -Patient notes improvement of leg swelling but notes urinary urgency and frequency since starting Lasix.  -Creatinine 2.06 -> 1.8 on 3/14.     Plan:  -Given patient's continued swelling and improvement of creatinine with diuresis, will likely require chronic diuretic use.  -Continue Lasix 20 mg daily.  -May require higher dose but given urinary side effects, will keep on 20 mg daily for now.  -Ordered BMP to be done prior to next visit.  -Follow up in April.     Orders:    furosemide (LASIX) 20 mg tablet; Take 1 tablet (20 mg total) by mouth daily    Basic metabolic panel; Future

## 2025-03-17 NOTE — PROGRESS NOTES
Name: Calvin Egan      : 1935      MRN: [id number]  Encounter Provider: Kei Pacheco MD  Encounter Date: 3/17/2025   Encounter department: [facility]  :  Assessment & Plan  Bilateral lower extremity edema  -Patient presenting as follow up from 3/11 appointment for leg swelling.  -S/P Lasix 20 mg for 5 days.  -Patient notes improvement of leg swelling but notes urinary urgency and frequency since starting Lasix.  -Creatinine 2.06 -> 1.8 on 3/14.     Plan:  -Given patient's continued swelling and improvement of creatinine with diuresis, will likely require chronic diuretic use.  -Continue Lasix 20 mg daily.  -May require higher dose but given urinary side effects, will keep on 20 mg daily for now.  -Ordered BMP to be done prior to next visit.  -Follow up in April.     Orders:    furosemide (LASIX) 20 mg tablet; Take 1 tablet (20 mg total) by mouth daily    Basic metabolic panel; Future           History of Present Illness   Patient is an 88 y/o male who presents today for lower extremity swelling. He was seen in the office on 3/11 and given a 5 day course of Lasix. Notes mild improvement of his lower extremity swelling while on Lasix. His creatinine was also slightly improved. Does note significant urinary side effects of the Lasix. Reviewed patient's current medications.         Review of Systems   Constitutional:  Negative for chills and fever.   HENT:  Negative for ear pain and sore throat.    Eyes:  Negative for pain and visual disturbance.   Respiratory:  Negative for cough and shortness of breath.    Cardiovascular:  Negative for chest pain and palpitations.   Gastrointestinal:  Negative for abdominal pain and vomiting.   Genitourinary:  Negative for dysuria and hematuria.   Musculoskeletal:  Negative for arthralgias and back pain.   Skin:  Negative for color change and rash.   Neurological:  Negative for seizures and syncope.  "  Psychiatric/Behavioral:  The patient is not nervous/anxious.    All other systems reviewed and are negative.      Objective   /74 (BP Location: Left arm, Patient Position: Sitting, Cuff Size: Standard)   Pulse 76   Temp 98 °F (36.7 °C) (Temporal)   Ht 5' 11\" (1.803 m)   Wt 84.4 kg (186 lb)   SpO2 98%   BMI 25.94 kg/m²      Physical Exam  Vitals and nursing note reviewed.   Constitutional:       General: He is not in acute distress.     Appearance: He is well-developed.   HENT:      Head: Normocephalic and atraumatic.      Right Ear: External ear normal.      Left Ear: External ear normal.      Nose: Nose normal.      Mouth/Throat:      Mouth: Mucous membranes are moist.   Eyes:      Conjunctiva/sclera: Conjunctivae normal.   Cardiovascular:      Rate and Rhythm: Normal rate and regular rhythm.      Heart sounds: No murmur heard.  Pulmonary:      Effort: Pulmonary effort is normal. No respiratory distress.      Breath sounds: Normal breath sounds.   Abdominal:      Palpations: Abdomen is soft.      Tenderness: There is no abdominal tenderness.   Musculoskeletal:         General: Swelling present.      Cervical back: Neck supple.      Right lower leg: Edema present.      Left lower leg: Edema present.      Comments: B/L lower extremity edema.   Skin:     General: Skin is warm and dry.      Capillary Refill: Capillary refill takes less than 2 seconds.   Neurological:      General: No focal deficit present.      Mental Status: He is alert and oriented to person, place, and time.   Psychiatric:         Mood and Affect: Mood normal.         "

## 2025-03-17 NOTE — PROGRESS NOTES
Diabetic Foot Exam    Patient's shoes and socks removed.    Right Foot/Ankle   Right Foot Inspection  Skin Exam: skin normal and skin intact. No dry skin, no warmth, no callus, no erythema, no maceration, no abnormal color, no pre-ulcer, no ulcer and no callus.     Toe Exam: ROM and strength within normal limits and swelling.     Sensory   Vibration: diminished  Proprioception: diminished  Monofilament testing: diminished    Vascular  Capillary refills: < 3 seconds  The right DP pulse is 2+. The right PT pulse is 2+.     Left Foot/Ankle  Left Foot Inspection  Skin Exam: skin normal and skin intact. No dry skin, no warmth, no erythema, no maceration, normal color, no pre-ulcer, no ulcer and no callus.     Toe Exam: ROM and strength within normal limits and swelling.     Sensory   Vibration: diminished  Proprioception: diminished  Monofilament testing: diminished    Vascular  Capillary refills: < 3 seconds  The left DP pulse is 2+. The left PT pulse is 2+.     Assign Risk Category  No deformity present  Loss of protective sensation  No weak pulses  Risk: 1

## 2025-03-17 NOTE — PATIENT INSTRUCTIONS
I have prescribed Lasix - please take 1 tablet (20 mg) daily.  I have ordered a metabolic panel to be done before your next visit.  You have a follow up in April.

## 2025-03-20 DIAGNOSIS — R60.0 BILATERAL LOWER EXTREMITY EDEMA: ICD-10-CM

## 2025-03-20 DIAGNOSIS — L85.3 DRY SKIN: ICD-10-CM

## 2025-03-20 RX ORDER — FUROSEMIDE 20 MG/1
20 TABLET ORAL DAILY
Qty: 90 TABLET | Refills: 0 | Status: SHIPPED | OUTPATIENT
Start: 2025-03-20 | End: 2025-06-18

## 2025-03-20 RX ORDER — CERAMIDE 1,3,6-II/SALICYLIC/B3
1 CLEANSER (ML) TOPICAL 3 TIMES DAILY
Qty: 453 G | Refills: 0 | Status: SHIPPED | OUTPATIENT
Start: 2025-03-20

## 2025-03-20 NOTE — TELEPHONE ENCOUNTER
Reason for call:   [x] Refill   [] Prior Auth  [] Other:     Office:   [x] PCP/Provider - [facility]   [] Specialty/Provider -     furosemide (LASIX) 20 mg tab   20 mg daily   90     Emollient (CeraVe Moisturizing) CREA    1 application 3 times daily    453 g    Pharmacy: [facility]    Local Pharmacy   Does the patient have enough for 3 days?   [] Yes   [x] No - Send as HP to POD    Mail Away Pharmacy   Does the patient have enough for 10 days?   [] Yes   [] No - Send as HP to POD     Face Mask

## 2025-04-25 ENCOUNTER — TELEPHONE (OUTPATIENT)
Dept: ADMINISTRATIVE | Facility: OTHER | Age: 89
End: 2025-04-25

## 2025-04-25 NOTE — TELEPHONE ENCOUNTER
04/25/25 11:41 AM    Patient contacted to bring Advance Directive, POLST, or Living Will document to next scheduled pcp visit.[facility] Department spoke with patient/ caregiver.    Thank you.  Chelly Soliz MA  [facility]

## 2025-04-28 ENCOUNTER — OFFICE VISIT (OUTPATIENT)
Dept: INTERNAL MEDICINE CLINIC | Facility: OTHER | Age: 89
End: 2025-04-28
Payer: COMMERCIAL

## 2025-04-28 VITALS
OXYGEN SATURATION: 97 % | HEART RATE: 69 BPM | HEIGHT: 71 IN | BODY MASS INDEX: 26.88 KG/M2 | RESPIRATION RATE: 20 BRPM | SYSTOLIC BLOOD PRESSURE: 122 MMHG | WEIGHT: 192 LBS | DIASTOLIC BLOOD PRESSURE: 70 MMHG | TEMPERATURE: 98.2 F

## 2025-04-28 DIAGNOSIS — R60.0 BILATERAL LOWER EXTREMITY EDEMA: ICD-10-CM

## 2025-04-28 DIAGNOSIS — L85.3 DRY SKIN: ICD-10-CM

## 2025-04-28 DIAGNOSIS — I10 ESSENTIAL HYPERTENSION: ICD-10-CM

## 2025-04-28 DIAGNOSIS — I48.91 ATRIAL FIBRILLATION, UNSPECIFIED TYPE (HCC): ICD-10-CM

## 2025-04-28 DIAGNOSIS — N39.41 BENIGN PROSTATIC HYPERPLASIA (BPH) WITH URINARY URGE INCONTINENCE: ICD-10-CM

## 2025-04-28 DIAGNOSIS — N18.32 STAGE 3B CHRONIC KIDNEY DISEASE (HCC): ICD-10-CM

## 2025-04-28 DIAGNOSIS — E78.2 MIXED HYPERLIPIDEMIA: ICD-10-CM

## 2025-04-28 DIAGNOSIS — L89.322 PRESSURE INJURY OF LEFT BUTTOCK, STAGE 2 (HCC): ICD-10-CM

## 2025-04-28 DIAGNOSIS — F41.1 GENERALIZED ANXIETY DISORDER: ICD-10-CM

## 2025-04-28 DIAGNOSIS — N40.1 BENIGN PROSTATIC HYPERPLASIA (BPH) WITH URINARY URGE INCONTINENCE: ICD-10-CM

## 2025-04-28 DIAGNOSIS — E11.9 TYPE 2 DIABETES MELLITUS WITHOUT COMPLICATION, WITHOUT LONG-TERM CURRENT USE OF INSULIN (HCC): Primary | ICD-10-CM

## 2025-04-28 PROBLEM — N32.81 OVERACTIVE BLADDER: Status: RESOLVED | Noted: 2017-07-13 | Resolved: 2025-04-28

## 2025-04-28 PROCEDURE — 99214 OFFICE O/P EST MOD 30 MIN: CPT | Performed by: INTERNAL MEDICINE

## 2025-04-28 RX ORDER — CERAMIDE 1,3,6-II/SALICYLIC/B3
1 CLEANSER (ML) TOPICAL 3 TIMES DAILY
Qty: 453 G | Refills: 1 | Status: SHIPPED | OUTPATIENT
Start: 2025-04-28

## 2025-04-28 RX ORDER — TAMSULOSIN HYDROCHLORIDE 0.4 MG/1
0.4 CAPSULE ORAL
Qty: 90 CAPSULE | Refills: 3 | Status: SHIPPED | OUTPATIENT
Start: 2025-04-28

## 2025-04-28 NOTE — PROGRESS NOTES
Name: Calvin Egan      : 1935      MRN: [id number]  Encounter Provider: Cooper Barros MD  Encounter Date: 2025   Encounter department: [facility]  :  Assessment & Plan  Dry skin    Orders:  •  Emollient (CeraVe Moisturizing) CREA; Apply 1 Application topically 3 (three) times a day    Type 2 diabetes mellitus without complication, without long-term current use of insulin (HCC)  Continue current  diabetic regimen.  Continue to trend A1c.  Lab Results   Component Value Date    HGBA1C 7.6 (H) 2025     Orders:  •  Albumin / creatinine urine ratio; Future  •  CBC and differential; Future  •  Comprehensive metabolic panel; Future  •  Hemoglobin A1C; Future    Essential hypertension  Controlled, Continue current antihypertensive regimen.        Atrial fibrillation, unspecified type ([facility])  Continue metoprolol tartrate and Eliquis anticoagulation.       Stage 3b chronic kidney disease (HCC)  Lab Results   Component Value Date    EGFR 32 2025    EGFR 27 2025    EGFR 29 2025    CREATININE 1.80 (H) 2025    CREATININE 2.06 (H) 2025    CREATININE 1.99 (H) 2025   Stable, continue to trend kidney function.       Benign prostatic hyperplasia (BPH) with urinary urge incontinence  Will trial flomax. Monitor clinically.   Orders:  •  tamsulosin (FLOMAX) 0.4 mg; Take 1 capsule (0.4 mg total) by mouth daily with dinner    Generalized anxiety disorder  Stable, controlled.  Continue mirtazapine.       Pressure injury of left buttock, stage 2 ([facility])  Will refer for home health wound care.  No active signs of infection.  Orders:  •  EXTERNAL Referral to Home Health; Future    Bilateral lower extremity edema  Continue furosemide 20 mg daily.       Mixed hyperlipidemia  Controlled.  Continue simvastatin 20 mg daily.              History of Present Illness   Calvin Egan is seen today for follow up of chronic conditions.   Recent laboratory  studies reviewed today with the patient.   he has been compliant with his medication regimen.   He has a pressure ulcer on his sacral region which has not completely healed.  He denies any drainage.  He has been trying to keep the area clean however has urinary incontinence which he feels is inhibiting the healing.  he has no complaints or concerns at this time.       Hyperlipidemia  This is a chronic problem. The current episode started more than 1 year ago. The problem is controlled. Recent lipid tests were reviewed and are normal. Pertinent negatives include no chest pain or shortness of breath. Current antihyperlipidemic treatment includes statins. The current treatment provides moderate improvement of lipids. There are no compliance problems.    Diabetes  He presents for his follow-up diabetic visit. He has type 2 diabetes mellitus. His disease course has been stable. Pertinent negatives for hypoglycemia include no dizziness or headaches. There are no diabetic associated symptoms. Pertinent negatives for diabetes include no chest pain, no fatigue and no weakness. Current diabetic treatment includes oral agent (dual therapy). He is compliant with treatment all of the time. An ACE inhibitor/angiotensin II receptor blocker is not being taken.   Hypertension  This is a chronic problem. The current episode started more than 1 year ago. The problem is unchanged. The problem is controlled. Pertinent negatives include no chest pain, headaches, palpitations or shortness of breath. Past treatments include diuretics and beta blockers. The current treatment provides moderate improvement. There are no compliance problems.      Review of Systems   Constitutional:  Negative for activity change, appetite change, chills, diaphoresis, fatigue and fever.   HENT:  Negative for congestion, postnasal drip, rhinorrhea, sinus pressure, sinus pain, sneezing and sore throat.    Eyes:  Negative for visual disturbance.   Respiratory:   "Negative for apnea, cough, choking, chest tightness, shortness of breath and wheezing.    Cardiovascular:  Negative for chest pain, palpitations and leg swelling.   Gastrointestinal:  Negative for abdominal distention, abdominal pain, anal bleeding, blood in stool, constipation, diarrhea, nausea and vomiting.   Endocrine: Negative for cold intolerance and heat intolerance.   Genitourinary:  Negative for difficulty urinating, dysuria and hematuria.   Musculoskeletal: Negative.    Skin: Negative.    Neurological:  Negative for dizziness, weakness, light-headedness, numbness and headaches.   Hematological:  Negative for adenopathy.   Psychiatric/Behavioral:  Negative for agitation, sleep disturbance and suicidal ideas.    All other systems reviewed and are negative.      Objective   /70 (BP Location: Left arm, Patient Position: Sitting, Cuff Size: Standard)   Pulse 69   Temp 98.2 °F (36.8 °C) (Temporal)   Resp 20   Ht 5' 11\" (1.803 m)   Wt 87.1 kg (192 lb)   SpO2 97%   BMI 26.78 kg/m²      Physical Exam  Vitals and nursing note reviewed.   Constitutional:       General: He is not in acute distress.     Appearance: He is well-developed. He is not diaphoretic.   HENT:      Head: Normocephalic and atraumatic.   Eyes:      General: No scleral icterus.        Right eye: No discharge.         Left eye: No discharge.      Conjunctiva/sclera: Conjunctivae normal.      Pupils: Pupils are equal, round, and reactive to light.   Neck:      Thyroid: No thyromegaly.      Vascular: No JVD.   Cardiovascular:      Rate and Rhythm: Normal rate and regular rhythm.      Heart sounds: Normal heart sounds. No murmur heard.     No friction rub. No gallop.   Pulmonary:      Effort: Pulmonary effort is normal. No respiratory distress.      Breath sounds: Normal breath sounds. No wheezing or rales.   Chest:      Chest wall: No tenderness.   Abdominal:      General: Bowel sounds are normal. There is no distension.      Palpations: " Abdomen is soft. There is no mass.      Tenderness: There is no abdominal tenderness. There is no guarding or rebound.   Musculoskeletal:         General: No tenderness or deformity. Normal range of motion.      Cervical back: Normal range of motion and neck supple.      Right lower le+ Edema present.      Left lower le+ Edema present.   Lymphadenopathy:      Cervical: No cervical adenopathy.   Skin:     General: Skin is warm and dry.      Coloration: Skin is not pale.      Findings: No erythema or rash.          Neurological:      Mental Status: He is alert and oriented to person, place, and time.      Cranial Nerves: No cranial nerve deficit.      Coordination: Coordination normal.      Deep Tendon Reflexes: Reflexes are normal and symmetric.   Psychiatric:         Behavior: Behavior normal.         Thought Content: Thought content normal.         Judgment: Judgment normal.

## 2025-04-28 NOTE — ASSESSMENT & PLAN NOTE
Continue current  diabetic regimen.  Continue to trend A1c.  Lab Results   Component Value Date    HGBA1C 7.6 (H) 01/31/2025     Orders:  •  Albumin / creatinine urine ratio; Future  •  CBC and differential; Future  •  Comprehensive metabolic panel; Future  •  Hemoglobin A1C; Future

## 2025-04-28 NOTE — ASSESSMENT & PLAN NOTE
Will refer for home health wound care.  No active signs of infection.  Orders:  •  EXTERNAL Referral to Home Health; Future

## 2025-04-28 NOTE — ASSESSMENT & PLAN NOTE
Will trial flomax. Monitor clinically.   Orders:  •  tamsulosin (FLOMAX) 0.4 mg; Take 1 capsule (0.4 mg total) by mouth daily with dinner

## 2025-04-28 NOTE — ASSESSMENT & PLAN NOTE
Lab Results   Component Value Date    EGFR 32 03/14/2025    EGFR 27 03/04/2025    EGFR 29 02/27/2025    CREATININE 1.80 (H) 03/14/2025    CREATININE 2.06 (H) 03/04/2025    CREATININE 1.99 (H) 02/27/2025   Stable, continue to trend kidney function.

## 2025-05-06 ENCOUNTER — TELEPHONE (OUTPATIENT)
Age: 89
End: 2025-05-06

## 2025-05-06 DIAGNOSIS — E78.2 MIXED HYPERLIPIDEMIA: ICD-10-CM

## 2025-05-06 DIAGNOSIS — N39.41 BENIGN PROSTATIC HYPERPLASIA (BPH) WITH URINARY URGE INCONTINENCE: ICD-10-CM

## 2025-05-06 DIAGNOSIS — N40.1 BENIGN PROSTATIC HYPERPLASIA (BPH) WITH URINARY URGE INCONTINENCE: ICD-10-CM

## 2025-05-06 DIAGNOSIS — R60.0 BILATERAL LOWER EXTREMITY EDEMA: ICD-10-CM

## 2025-05-06 RX ORDER — TAMSULOSIN HYDROCHLORIDE 0.4 MG/1
0.4 CAPSULE ORAL
Qty: 90 CAPSULE | Refills: 1 | Status: SHIPPED | OUTPATIENT
Start: 2025-05-06

## 2025-05-06 RX ORDER — FUROSEMIDE 20 MG/1
20 TABLET ORAL DAILY
Qty: 90 TABLET | Refills: 1 | Status: CANCELLED | OUTPATIENT
Start: 2025-05-06

## 2025-05-06 RX ORDER — ATORVASTATIN CALCIUM 20 MG/1
20 TABLET, FILM COATED ORAL DAILY
Qty: 90 TABLET | Refills: 1 | Status: SHIPPED | OUTPATIENT
Start: 2025-05-06

## 2025-05-06 RX ORDER — TAMSULOSIN HYDROCHLORIDE 0.4 MG/1
0.4 CAPSULE ORAL
Qty: 90 CAPSULE | Refills: 1 | Status: SHIPPED | OUTPATIENT
Start: 2025-05-06 | End: 2025-05-06

## 2025-05-06 RX ORDER — ATORVASTATIN CALCIUM 20 MG/1
20 TABLET, FILM COATED ORAL DAILY
Qty: 90 TABLET | Refills: 1 | Status: SHIPPED | OUTPATIENT
Start: 2025-05-06 | End: 2025-05-06

## 2025-05-06 NOTE — TELEPHONE ENCOUNTER
SHERI Barros    Medication discrepancy noted by [facility] Nurse Edwardo  Atorvastatin 20 mg daily is not in the home.  2.   Flomax 0.4 given on 4/28 is not received by the patient yet.    Please do the needful.    Thank you!!

## 2025-05-06 NOTE — TELEPHONE ENCOUNTER
Patient in need of refills.   Last OV: 4/28/2025    Next OV: 10/9/2025    Please advise provider out on PTO

## 2025-05-15 ENCOUNTER — TELEPHONE (OUTPATIENT)
Age: 89
End: 2025-05-15

## 2025-05-15 DIAGNOSIS — M47.817 SPONDYLOSIS OF LUMBOSACRAL REGION, UNSPECIFIED SPINAL OSTEOARTHRITIS COMPLICATION STATUS: Primary | ICD-10-CM

## 2025-05-15 DIAGNOSIS — M19.90 OSTEOARTHRITIS, UNSPECIFIED OSTEOARTHRITIS TYPE, UNSPECIFIED SITE: ICD-10-CM

## 2025-05-15 DIAGNOSIS — L89.322 STAGE II PRESSURE ULCER OF LEFT BUTTOCK (HCC): ICD-10-CM

## 2025-05-15 NOTE — TELEPHONE ENCOUNTER
Called [facility].  Was transferred three times until spoke to correct person.      The RN who saw pt was the one who called.     I spoke to a Isaiah who saw her notes.  RN needs order for PT, not OT, to assist with transfer and repositioning.  I will place order for PT.    Per [facility], they are faxing another [facility] request to us and we will complete for billing for insurance.       Please fax order for PT to [facility]

## 2025-05-15 NOTE — TELEPHONE ENCOUNTER
[facility] home care call about sending an ordering for womb care dressing and also an order for OT to doctor Cooper Barros. Thank you

## 2025-05-15 NOTE — TELEPHONE ENCOUNTER
Do you have the number of [facility] and who called?  The number on incoming call is that of patient.  Thank so much.

## 2025-05-22 ENCOUNTER — OFFICE VISIT (OUTPATIENT)
Dept: INTERNAL MEDICINE CLINIC | Facility: OTHER | Age: 89
End: 2025-05-22

## 2025-05-22 ENCOUNTER — HOSPITAL ENCOUNTER (OUTPATIENT)
Dept: RADIOLOGY | Facility: IMAGING CENTER | Age: 89
End: 2025-05-22
Payer: COMMERCIAL

## 2025-05-22 ENCOUNTER — RESULTS FOLLOW-UP (OUTPATIENT)
Dept: INTERNAL MEDICINE CLINIC | Facility: OTHER | Age: 89
End: 2025-05-22

## 2025-05-22 VITALS
HEIGHT: 71 IN | DIASTOLIC BLOOD PRESSURE: 70 MMHG | TEMPERATURE: 97.3 F | OXYGEN SATURATION: 96 % | HEART RATE: 65 BPM | SYSTOLIC BLOOD PRESSURE: 118 MMHG | WEIGHT: 191 LBS | BODY MASS INDEX: 26.74 KG/M2

## 2025-05-22 DIAGNOSIS — M25.521 RIGHT ELBOW PAIN: Primary | ICD-10-CM

## 2025-05-22 DIAGNOSIS — E79.0 HYPERURICEMIA: ICD-10-CM

## 2025-05-22 DIAGNOSIS — M25.521 RIGHT ELBOW PAIN: ICD-10-CM

## 2025-05-22 PROCEDURE — 73080 X-RAY EXAM OF ELBOW: CPT

## 2025-05-22 RX ORDER — ATORVASTATIN CALCIUM 20 MG/1
20 TABLET, FILM COATED ORAL DAILY
Qty: 90 TABLET | Refills: 1 | Status: CANCELLED | OUTPATIENT
Start: 2025-05-22

## 2025-05-22 RX ORDER — MIRTAZAPINE 30 MG/1
30 TABLET, FILM COATED ORAL
Qty: 90 TABLET | Refills: 1 | Status: CANCELLED | OUTPATIENT
Start: 2025-05-22 | End: 2025-11-18

## 2025-05-22 RX ORDER — HYDROXYCHLOROQUINE SULFATE 200 MG/1
200 TABLET, FILM COATED ORAL
Qty: 90 TABLET | Refills: 1 | Status: CANCELLED | OUTPATIENT
Start: 2025-05-22 | End: 2025-11-18

## 2025-05-22 RX ORDER — PREDNISONE 20 MG/1
40 TABLET ORAL DAILY
Qty: 10 TABLET | Refills: 0 | Status: SHIPPED | OUTPATIENT
Start: 2025-05-22 | End: 2025-05-22 | Stop reason: SDUPTHER

## 2025-05-22 RX ORDER — METOPROLOL TARTRATE 25 MG/1
12.5 TABLET, FILM COATED ORAL EVERY 12 HOURS SCHEDULED
Qty: 90 TABLET | Refills: 1 | Status: CANCELLED | OUTPATIENT
Start: 2025-05-22

## 2025-05-22 RX ORDER — APIXABAN 2.5 MG/1
2.5 TABLET, FILM COATED ORAL 2 TIMES DAILY
Qty: 180 TABLET | Refills: 1 | Status: CANCELLED | OUTPATIENT
Start: 2025-05-22

## 2025-05-22 RX ORDER — EMPAGLIFLOZIN 10 MG/1
10 TABLET, FILM COATED ORAL DAILY
Qty: 90 TABLET | Refills: 1 | Status: CANCELLED | OUTPATIENT
Start: 2025-05-22

## 2025-05-22 RX ORDER — PREDNISONE 20 MG/1
40 TABLET ORAL DAILY
Qty: 10 TABLET | Refills: 0 | Status: SHIPPED | OUTPATIENT
Start: 2025-05-22 | End: 2025-05-27

## 2025-05-22 RX ORDER — FUROSEMIDE 20 MG/1
20 TABLET ORAL DAILY
Qty: 90 TABLET | Refills: 0 | Status: CANCELLED | OUTPATIENT
Start: 2025-05-22 | End: 2025-08-20

## 2025-05-22 NOTE — PATIENT INSTRUCTIONS
- Tylenol 1000mg three times daily as needed for pain  - Rest, ice, elevate arm   - Go for XR STAT

## 2025-05-22 NOTE — PROGRESS NOTES
Name: Calvin Egan      : 1935      MRN: [id number]  Encounter Provider: Esme Vilchis PA-C  Encounter Date: 2025   Encounter department: [facility]  :  Assessment & Plan  Right elbow pain  Patient sent for stat x-ray.  X-ray reviewed which shows no acute abnormalities  Will treat for presumed gout flare with prednisone 40 mg x 5 days  Recommended Tylenol 1000 mg 3 times daily as needed for pain.  Max daily dose of 3000 mg daily  Encouraged to rest, ice, elevation of arm  Follow-up in office in 1 week or sooner if symptoms worsen  Orders:    XR elbow 3+ vw right; Future    predniSONE 20 mg tablet; Take 2 tablets (40 mg total) by mouth daily for 5 days    Hyperuricemia  Last uric acid level within acceptable range.  Will treat for presumed gout flare as above                History of Present Illness   Patient is an 89-year-old male presenting to the office for concern of elbow pain x 1 day  He also reports that he fell this morning getting onto the bus, denies injuries from this fall  Reports he fell on his side.  He reports that he slipped on the top step as it was slippery following the rain  Denies pain following the fall  Denies LOC or head strike     Reports the elbow pain began yesterday afternoon  He endorses pain, swelling, warmth to touch  Denies known injury of the elbow  Denies fevers or chills  He does have a history of gout, states symptoms are similar to previous flares although he has never had issues with this elbow  Tx tried: none       Elbow Pain  This is a new problem. The current episode started yesterday. The problem has been unchanged. Associated symptoms include arthralgias. Pertinent negatives include no chest pain, chills, fatigue, fever, headaches, nausea, neck pain, numbness, rash, vomiting or weakness.     Review of Systems   Constitutional:  Positive for activity change. Negative for chills, fatigue and fever.   Respiratory:   "Negative for chest tightness, shortness of breath and wheezing.    Cardiovascular:  Negative for chest pain and palpitations.   Gastrointestinal:  Negative for nausea and vomiting.   Musculoskeletal:  Positive for arthralgias and gait problem. Negative for back pain and neck pain.   Skin:  Negative for color change (erythema), rash and wound.   Neurological:  Negative for dizziness, weakness, light-headedness, numbness and headaches.       Objective   /70 (BP Location: Left arm, Patient Position: Sitting, Cuff Size: Adult)   Pulse 65   Temp (!) 97.3 °F (36.3 °C) (Temporal)   Ht 5' 11\" (1.803 m)   Wt 86.6 kg (191 lb)   SpO2 96%   BMI 26.64 kg/m²      Physical Exam  Vitals and nursing note reviewed.   Constitutional:       General: He is not in acute distress.     Appearance: Normal appearance. He is not ill-appearing.   HENT:      Head: Normocephalic and atraumatic.      Right Ear: External ear normal.      Left Ear: External ear normal.      Nose: Nose normal.      Mouth/Throat:      Mouth: Mucous membranes are moist.      Pharynx: Oropharynx is clear.     Eyes:      General: No scleral icterus.     Conjunctiva/sclera: Conjunctivae normal.       Cardiovascular:      Rate and Rhythm: Normal rate and regular rhythm.      Heart sounds: Normal heart sounds. No murmur heard.     No friction rub. No gallop.   Pulmonary:      Effort: Pulmonary effort is normal. No respiratory distress.      Breath sounds: Normal breath sounds. No wheezing, rhonchi or rales.   Chest:      Chest wall: No tenderness.     Musculoskeletal:      Right shoulder: Normal. No tenderness. Normal range of motion.      Left shoulder: Normal. No tenderness. Normal range of motion.      Right elbow: Swelling present. Decreased range of motion. Tenderness present in medial epicondyle, lateral epicondyle and olecranon process.      Right forearm: No tenderness.      Left forearm: No tenderness.      Right wrist: No tenderness. Normal range of " motion.      Left wrist: No tenderness. Normal range of motion.      Right lower leg: No edema.      Left lower leg: No edema.     Skin:     General: Skin is warm and dry.      Coloration: Skin is not pale.      Findings: Erythema (f R elbow) present.     Neurological:      General: No focal deficit present.      Mental Status: He is alert and oriented to person, place, and time. Mental status is at baseline.     Psychiatric:         Mood and Affect: Mood normal.         Behavior: Behavior normal.         Disclaimer: This note was generated with voice recognition software.  Phonetic, grammatical, and spelling errors may be present as a result.  Please contact provider with any concerns or questions

## 2025-05-28 DIAGNOSIS — E11.9 TYPE 2 DIABETES MELLITUS WITHOUT COMPLICATION, WITHOUT LONG-TERM CURRENT USE OF INSULIN (HCC): Primary | ICD-10-CM

## 2025-05-29 RX ORDER — EMPAGLIFLOZIN 10 MG/1
10 TABLET, FILM COATED ORAL DAILY
Qty: 30 TABLET | Refills: 0 | Status: SHIPPED | OUTPATIENT
Start: 2025-05-29 | End: 2025-05-30 | Stop reason: SDUPTHER

## 2025-05-30 ENCOUNTER — TELEPHONE (OUTPATIENT)
Age: 89
End: 2025-05-30

## 2025-05-30 ENCOUNTER — TELEPHONE (OUTPATIENT)
Dept: ADMINISTRATIVE | Facility: OTHER | Age: 89
End: 2025-05-30

## 2025-05-30 DIAGNOSIS — E11.9 TYPE 2 DIABETES MELLITUS WITHOUT COMPLICATION, WITHOUT LONG-TERM CURRENT USE OF INSULIN (HCC): ICD-10-CM

## 2025-05-30 RX ORDER — EMPAGLIFLOZIN 10 MG/1
10 TABLET, FILM COATED ORAL DAILY
Qty: 90 TABLET | Refills: 1 | Status: SHIPPED | OUTPATIENT
Start: 2025-05-30

## 2025-05-30 NOTE — TELEPHONE ENCOUNTER
05/30/25 12:01 PM    Patient contacted to bring Advance Directive, POLST, or Living Will document to next scheduled pcp visit.[facility] Department left message.    Thank you.  Chelly Soliz MA  [facility]

## 2025-05-30 NOTE — TELEPHONE ENCOUNTER
[facility] called to report will be beginning  Physical therapy for gait.        Please review.  Thank you

## 2025-06-02 ENCOUNTER — OFFICE VISIT (OUTPATIENT)
Dept: INTERNAL MEDICINE CLINIC | Facility: OTHER | Age: 89
End: 2025-06-02
Payer: COMMERCIAL

## 2025-06-02 ENCOUNTER — TELEPHONE (OUTPATIENT)
Age: 89
End: 2025-06-02

## 2025-06-02 ENCOUNTER — OFFICE VISIT (OUTPATIENT)
Dept: OBGYN CLINIC | Facility: CLINIC | Age: 89
End: 2025-06-02
Payer: COMMERCIAL

## 2025-06-02 VITALS
DIASTOLIC BLOOD PRESSURE: 62 MMHG | HEIGHT: 71 IN | SYSTOLIC BLOOD PRESSURE: 122 MMHG | TEMPERATURE: 97.7 F | BODY MASS INDEX: 25.84 KG/M2 | WEIGHT: 184.6 LBS | HEART RATE: 59 BPM | OXYGEN SATURATION: 97 %

## 2025-06-02 VITALS — HEIGHT: 71 IN | WEIGHT: 184 LBS | BODY MASS INDEX: 25.76 KG/M2

## 2025-06-02 DIAGNOSIS — I10 ESSENTIAL HYPERTENSION: ICD-10-CM

## 2025-06-02 DIAGNOSIS — L03.113 CELLULITIS OF RIGHT ELBOW: Primary | ICD-10-CM

## 2025-06-02 DIAGNOSIS — M70.21 OLECRANON BURSITIS OF RIGHT ELBOW: Primary | ICD-10-CM

## 2025-06-02 PROCEDURE — 99204 OFFICE O/P NEW MOD 45 MIN: CPT | Performed by: FAMILY MEDICINE

## 2025-06-02 PROCEDURE — 99213 OFFICE O/P EST LOW 20 MIN: CPT

## 2025-06-02 RX ORDER — DOXYCYCLINE 100 MG/1
100 CAPSULE ORAL EVERY 12 HOURS SCHEDULED
Qty: 14 CAPSULE | Refills: 0 | Status: SHIPPED | OUTPATIENT
Start: 2025-06-02 | End: 2025-06-09

## 2025-06-02 NOTE — PROGRESS NOTES
Name: Calvin Egan      : 1935      MRN: [id number]  Encounter Provider: Esme Vilchis PA-C  Encounter Date: 2025   Encounter department: [facility]  :  Assessment & Plan  Cellulitis of right elbow  We will treat for cellulitis of right elbow with doxycycline 100 mg twice daily x 7 days.  Encouraged to take with food and full glass of water to prevent GI upset  ASAP referral sent to orthopedics, likely will need drainage of right elbow  Patient advised to proceed to ER for any fevers, chills, altered mental status  Follow-up in our office in 1 week.  Appointment may be canceled if patient sees orthopedics sooner for follow-up  Reviewed with Dr. Barros  Orders:    doxycycline hyclate (VIBRAMYCIN) 100 mg capsule; Take 1 capsule (100 mg total) by mouth every 12 (twelve) hours for 7 days    Ambulatory Referral to Orthopedic Surgery; Future    Essential hypertension  Controlled, Continue current antihypertensive regimen.                   History of Present Illness   Patient is an 89-year-old male presenting to the office for follow-up of his right elbow pain  X-ray ankle no acute osseous abnormality  He was treated with prednisone burst for suspected gout flare.  Reports that prednisone did not make improvement of his symptoms  Patient reports his right elbow pain is progressively worsening  He endorses pain, swelling, warmth to touch, erythema.  All of this has worsened since last visit  Denies known injury of the elbow  Denies fevers or chills, altered mental status      Arm Pain   The incident occurred more than 1 week ago. There was no injury mechanism (Patient did have fall, but pain began before). The pain is present in the right elbow. The quality of the pain is described as burning and shooting. The pain does not radiate. The pain is moderate. Pertinent negatives include no chest pain, muscle weakness, numbness or tingling. Treatments tried: Prednisone.  "          Review of Systems   Constitutional:  Positive for activity change. Negative for chills, fatigue and fever.   Respiratory:  Negative for cough, shortness of breath and wheezing.    Cardiovascular:  Negative for chest pain, palpitations and leg swelling.   Gastrointestinal:  Negative for nausea and vomiting.   Musculoskeletal:  Positive for arthralgias and joint swelling.   Skin:  Positive for color change. Negative for wound.   Neurological:  Negative for dizziness, tingling, light-headedness, numbness and headaches.   Psychiatric/Behavioral:  Negative for confusion.        Objective   /62 (BP Location: Left arm, Patient Position: Sitting, Cuff Size: Adult)   Pulse 59   Temp 97.7 °F (36.5 °C) (Temporal)   Ht 5' 11\" (1.803 m)   Wt 83.7 kg (184 lb 9.6 oz)   SpO2 97%   BMI 25.75 kg/m²      Physical Exam  Vitals and nursing note reviewed.   Constitutional:       General: He is not in acute distress.     Appearance: Normal appearance. He is not ill-appearing.   HENT:      Head: Normocephalic and atraumatic.      Right Ear: External ear normal.      Left Ear: External ear normal.      Nose: Nose normal.      Mouth/Throat:      Mouth: Mucous membranes are moist.      Pharynx: Oropharynx is clear.     Eyes:      General: No scleral icterus.     Conjunctiva/sclera: Conjunctivae normal.       Cardiovascular:      Rate and Rhythm: Normal rate and regular rhythm.      Pulses:           Radial pulses are 2+ on the right side and 2+ on the left side.   Pulmonary:      Effort: Pulmonary effort is normal. No respiratory distress.      Breath sounds: Normal breath sounds. No wheezing, rhonchi or rales.   Chest:      Chest wall: No tenderness.     Musculoskeletal:      Right elbow: Swelling present. Normal range of motion. Tenderness present.      Left elbow: Normal.      Right lower leg: No edema.      Left lower leg: No edema.      Comments: No decreased ROM, although patient notes pain with flexion of elbow.  " Erythema, warmth, swelling noted over olecranon process.  Area is fluctuant.  No active drainage or open wound.  Dry skin noted     Skin:     General: Skin is warm and dry.      Findings: Erythema present.     Neurological:      General: No focal deficit present.      Mental Status: He is alert and oriented to person, place, and time. Mental status is at baseline.     Psychiatric:         Mood and Affect: Mood normal.         Behavior: Behavior normal.           Disclaimer: This note was generated with voice recognition software.  Phonetic, grammatical, and spelling errors may be present as a result.  Please contact provider with any concerns or questions

## 2025-06-02 NOTE — TELEPHONE ENCOUNTER
Caller: Patient    Doctor: Dr. Chopra / Jaymie    Reason for call: Force On for today // Patient states was seen at [facility] an needs to have his right elbow drained today he has a ride   // Cellulitis right elbow // Please advise and thank you    Call back#: [phone]

## 2025-06-02 NOTE — PROGRESS NOTES
Assessment:   Assessment & Plan        Diagnosis and Orders:      1. Olecranon bursitis of right elbow          No orders of the defined types were placed in this encounter.       Impression:   Left elbow pain likely secondary to olecranon bursitis.      Conservative Management   We discussed different treatment options:  Reviewed PCP documentation completed on 06/02/2025.  PCP is treating patient for right elbow cellulitis with doxycycline 100 mg twice daily x 7 days.  Patient has not picked up this medication yet.  Reviewed PCP documentation completed on 05/22/2025.  Treatment plan consisted of x-rays and prednisone 40 mg x 5 days for presumed gout flare.  Uric acid was 5.5 on February 2025.  A1c was 7.6 on January 2025.  Reviewed allergies to Augmentin and penicillin.  Will avoid Keflex/cephalosporins due to potential for 10% or less having cross-reactivity with allergies  We will continue with patient's doxycycline provided by PCP.  Will follow-up in 7 days.  Elbow was outlined.  Reviewed red flags with patient and when to seek immediate medical attention.  Such as worsening of redness erythema or warmth of the elbow  Ice or Heat Therapy as needed 1-2 times daily for 10-20 minutes. As tolerated.   Over the counter Tylenol and/or NSAIDs  as needed based off your Past Medical Hx. Please follow product label for dosing and maximum limits.    Area of concern was outlined with surgical pen.  Discussed patient should initiate antibiotic today or tomorrow.  Patient was prescribed doxycycline 100 mg twice daily x 7 days, we will continue with this  Will follow-up in 7 days to reevaluate and ensure resolution        Imaging  (I personally reviewed images in PACS and report):   Reviewed prior xrays obtain by Primary Care Physician. These were reviewed in office with patient today.   05/22/2025 right elbow x-ray: No acute osseous abnormality    Procedure  No Injection performed today. May consider future corticosteroid  injection depending on clinical exam/imaging.    Shared decision making, patient agreeable to plan.      Return for Follow up one week .    HPI:   Calvin Egan is a 89 y.o. male  who presents for evaluation of   Chief Complaint   Patient presents with    Right Elbow - Swelling, Pain       Mechanism of injury: Fall    Onset/Mechanism: Patient unfortunately fell 10 days ago injuring his right elbow..  Location: Olecranon bursa.  Severity: Current severity: 7/10.   Pain described as: Intermittent  Radiation: Denies.  Provocative: Lifting.  Associated symptoms: Swelling, redness of the skin and warmth.  Limited range of motion with flexion only    Denies any hx of fracture of affected limb.   Denies any surgical history of affected limb.      Summary of treatment to-date:   Oral steroids with improvement in the returning of olecranon bursal swelling after completing steroids      Following History Reviewed and Updated     Past Medical History[1]  Past Surgical History[2]  Family History[3]    Social History     Substance and Sexual Activity   Alcohol Use Not Currently     Social History     Substance and Sexual Activity   Drug Use Never     Tobacco Use History[4]    Social Drivers of Health     Tobacco Use: Medium Risk (6/2/2025)    Patient History     Smoking Tobacco Use: Former     Smokeless Tobacco Use: Never     Passive Exposure: Not on file   Alcohol Use: Not At Risk (9/13/2023)    Received from [facility]    AUDIT-C     Frequency of Alcohol Consumption: Not on file     Average Number of Drinks: Patient does not drink     Frequency of Binge Drinking: Never   Financial Resource Strain: Low Risk  (10/19/2023)    Received from [facility]    Overall Financial Resource Strain (CARDIA)     Difficulty of Paying Living Expenses: Not hard at all   Food Insecurity: No Food Insecurity (1/27/2025)    Nursing - Inadequate Food Risk Classification     Worried About Running Out of Food in the  Last Year: Never true     Ran Out of Food in the Last Year: Never true     Ran Out of Food in the Last Year: Not on file   Transportation Needs: No Transportation Needs (1/27/2025)    PRAPARE - Transportation     Lack of Transportation (Medical): No     Lack of Transportation (Non-Medical): No   Physical Activity: Not on file   Stress: Stress Concern Present (9/13/2023)    Received from [facility]    [employer] [place] [employer] - Occupational Stress Questionnaire     Feeling of Stress : To some extent   Social Connections: Moderately Integrated (9/13/2023)    Received from [facility]    Social Connection and Isolation Panel     In a typical week, how many times do you talk on the phone with family, friends, or neighbors?: Twice a week     How often do you get together with friends or relatives?: Once a week     How often do you attend Jehovah's witness or Gnosticism services?: More than 4 times per year     Do you belong to any clubs or organizations such as Jehovah's witness groups, unions, fraternal or athletic groups, or school groups?: Yes     How often do you attend meetings of the clubs or organizations you belong to?: More than 4 times per year     Are you , , , , never , or living with a partner?:    Intimate Partner Violence: Not At Risk (10/19/2023)    Received from [facility]    Humiliation, Afraid, Rape, and Kick questionnaire     Fear of Current or Ex-Partner: No     Emotionally Abused: No     Physically Abused: No     Sexually Abused: No   Depression: Not at risk (1/27/2025)    PHQ-2     PHQ-2 Score: 1   Housing Stability: Low Risk  (1/27/2025)    Housing Stability Vital Sign     Unable to Pay for Housing in the Last Year: No     Number of Times Moved in the Last Year: 1     Homeless in the Last Year: No   Utilities: Not At Risk (1/27/2025)    [facility] Utilities     Threatened with loss of utilities: No   Health  "Literacy: Not on file        Allergies[5]    Review of Systems      Review of Systems     Review of Systems   Constitutional: Negative for chills and fever.   HENT: Negative for drooling and sneezing.    Eyes: Negative for redness.   Respiratory: Negative for cough and wheezing.    Gastrointestinal: Negative for vomiting.   Psychiatric/Behavioral: Negative for behavioral problems. The patient is not nervous/anxious.      All other systems negative.   Physical Exam   Physical Exam    Vitals and nursing note reviewed.  Constitutional:   Appearance. Normal Appearance.  Ht 5' 11\" (1.803 m)   Wt 83.5 kg (184 lb)   BMI 25.66 kg/m²     Body mass index is 25.66 kg/m².   HENT:  Head: Atraumatic.  Nose: Nose normal  Eyes: Conjunctiva/sclera: Conjunctivae normal.  Cardiovascular:   Rate and Rhythm: Bilateral equal distal pulses  Pulmonary:   Effort: Pulmonary effort is normal  Skin:   General: Skin is warm and dry.  Neurological:   General: No focal deficit present.  Mental Status: Alert and oriented to person, place, and time.   Psychiatric:   Mood and Affect: mood normal.  Behavior: Behavior normal     Musculoskeletal Exam     Ortho Exam     Right ELBOW    INSPECTION:  See below for more detail  No gross deformity  Erythema Swelling Ecchymosis Increased warmth   + Olecranon bursa + Olecranon bursa no no     PASSIVE ROM:  Elbow Flexion/Extension Wrist Flexion/Extension Supination Pronation   full and symmetrical full and symmetrical intact and symmetrical intact and symmetrical     PALPATION/TENDERNESS  Medial epicondyle Lateral epicondyle Olecranon Olecranon fossa   Negative Neg. No pain to palpation, swollen olecranon. Neg.     Radial Head Ulnar collateral ligament (UCL) Valgus stress Varus stress   Negative Neg.       STRENGTH  Strength intact bilaterally      NEUROVASCULAR:  Sensation to light touch Neurovascularly intact distally   Intact  Yes      (Test not completed if space left blank )           Procedures     " "  Portions of the record may have been created with voice recognition software. Occasional wrong word or \"sound alike\" substitutions may have occurred due to the inherent limitations of voice recognition software. Please review the chart carefully and recognize, using context, where substitutions/typographical errors may have occurred.          [1]   Past Medical History:  Diagnosis Date    Chronic kidney disease, stage 3 (HCC)     with secondary hyperparathyroidism of renal origin    Diabetes mellitus (HCC)     Hyperlipidemia     Hypertension     Rheumatoid arthritis (HCC)    [2] No past surgical history on file.  [3] No family history on file.  [4]   Social History  Tobacco Use   Smoking Status Former    Current packs/day: 0.00    Average packs/day: 1 pack/day for 10.0 years (10.0 ttl pk-yrs)    Types: Cigarettes    Start date:     Quit date:     Years since quittin.4   Smokeless Tobacco Never   [5]   Allergies  Allergen Reactions    Sulfamethoxazole-Trimethoprim Hives    Penicillins Rash     "

## 2025-06-02 NOTE — PATIENT INSTRUCTIONS
- Take doxycycline with food and full glass of water  - schedule with orthopedics  - proceed to ER for any fevers, chills, altered mental status

## 2025-06-11 ENCOUNTER — OFFICE VISIT (OUTPATIENT)
Dept: OBGYN CLINIC | Facility: OTHER | Age: 89
End: 2025-06-11
Payer: COMMERCIAL

## 2025-06-11 DIAGNOSIS — M70.21 OLECRANON BURSITIS OF RIGHT ELBOW: Primary | ICD-10-CM

## 2025-06-11 PROCEDURE — 99204 OFFICE O/P NEW MOD 45 MIN: CPT | Performed by: ORTHOPAEDIC SURGERY

## 2025-06-11 PROCEDURE — 20605 DRAIN/INJ JOINT/BURSA W/O US: CPT | Performed by: ORTHOPAEDIC SURGERY

## 2025-06-11 RX ORDER — CEPHALEXIN 500 MG/1
500 CAPSULE ORAL EVERY 6 HOURS SCHEDULED
Qty: 12 CAPSULE | Refills: 0 | Status: SHIPPED | OUTPATIENT
Start: 2025-06-11 | End: 2025-06-13

## 2025-06-11 RX ORDER — BUPIVACAINE HYDROCHLORIDE 2.5 MG/ML
4 INJECTION, SOLUTION INFILTRATION; PERINEURAL
Status: COMPLETED | OUTPATIENT
Start: 2025-06-11 | End: 2025-06-11

## 2025-06-11 RX ADMIN — BUPIVACAINE HYDROCHLORIDE 4 ML: 2.5 INJECTION, SOLUTION INFILTRATION; PERINEURAL at 14:45

## 2025-06-11 NOTE — PROGRESS NOTES
Orthopaedic Surgery - Office Note  Calvin Egan (89 y.o. male)   : 1935   MRN: [id number]  Encounter Date: 2025    Assessment & Plan  Olecranon bursitis of right elbow    Orders:    cephalexin (KEFLEX) 500 mg capsule; Take 1 capsule (500 mg total) by mouth every 6 (six) hours for 3 days    Medium joint arthrocentesis: R olecranon bursa        Assessment / Plan    Right olecranon bursitis    Bursa was sterilely aspirated and filled with packing in the office. He should remove the packing tomorrow  Patient was instructed to remove the dressings tomorrow. Use tweezers to remove packing. He can then cover it again   3 day course of anti-biotics was sent to the pharmacy   If pain increase, increased redness or drainage we will have to surgical remove the bursa    Patient can call if symptoms fail to improve  Reviewed notes from Dr. Chang  Follow-up:  Return if symptoms worsen or fail to improve.      Chief Complaint / Date of Onset  right elbow pain   Injury Mechanism / Date  26 fell injuring his elbow   Surgery / Date  None    History of Present Illness   Calvin Egan is a 89 y.o. male who presents for consultation of right olecranon bursitis at the request of Dr. Chang. He fell 3 weeks ago and has notices a large bump on his elbow. He states it has gotten bigger since the original injury. The mass is firm. He denies any weakness, loss of motion. He states that his has occurred before. He denies any radiating symptoms.     Treatment Summary  Medications / Modalities  Completed steroids and antibiotics with mild relief   Bracing / Immobilization  None  Physical Therapy  None  Injections  None  Prior Surgeries  None  Other Treatments  None    Employment / Current Status  Retired     Sport / Organization / Current Status  N/A      Review of Systems  Pertinent items are noted in HPI.  All other systems were reviewed and are negative.      Physical Exam  There were no vitals taken for  this visit.  Cons: Appears well.  No apparent distress.  Psych: Alert. Oriented x3.  Mood and affect normal.  Eyes: PERRLA, EOMI  Resp: Normal effort.  No audible wheezing or stridor.  CV: Palpable pulse.  No discernable arrhythmia.  No LE edema.  Lymph:  No palpable cervical, axillary, or inguinal lymphadenopathy.  Skin: Warm.  No palpable masses.  No visible lesions.  Neuro: Normal muscle tone.  Normal and symmetric DTR's.     Right Elbow Exam  Alignment:  Normal elbow alignment and carrying angle.  Inspection:  plum size swelling. mildly erythema. no ecchymosis.  Palpation:  mild  tenderness. Mild  effusion. no warmth. Firm mass   ROM:  Elbow Extension 0. Elbow Flexion 145.  Strength:  Not tested.  Stability:  Not tested.  Tests:  Not tested  Neurovascular:  Sensation intact in Ax/R/M/U nerve distributions. 2+ radial pulse.       Studies Reviewed  I have personally reviewed pertinent films in PACS.  XR of right elbow - images from 5/22/2025 showing no abnormalities or fractures       Medium joint arthrocentesis: R olecranon bursa    Performed by: Jonathan Torres MD  Authorized by: Jonathan Torres MD    Universal Protocol:  Consent: Verbal consent obtained  Consent given by: patient  Patient identity confirmed: verbally with patient  Supporting Documentation  Indications: joint swelling   Procedure Details  Location: elbow - R olecranon bursa  Needle size: 25 G  Ultrasound guidance: no  Approach: anterolateral  Medications administered: 4 mL bupivacaine 0.25 %          No procedures today.    Medical, Surgical, Family, and Social History  The patient's medical history, family history, and social history, were reviewed and updated as appropriate.    Past Medical History[1]    Past Surgical History[2]    Family History[3]    Social History     Occupational History    Not on file   Tobacco Use    Smoking status: Former     Current packs/day: 0.00     Average packs/day: 1 pack/day for 10.0 years (10.0 ttl  pk-yrs)     Types: Cigarettes     Start date:      Quit date:      Years since quittin.4    Smokeless tobacco: Never   Vaping Use    Vaping status: Never Used   Substance and Sexual Activity    Alcohol use: Not Currently    Drug use: Never    Sexual activity: Not Currently       Allergies[4]    Current Medications[5]      Esme Mackey    Scribe Attestation      I,:   am acting as a scribe while in the presence of the attending physician.:       I,:   personally performed the services described in this documentation    as scribed in my presence.:                [1]   Past Medical History:  Diagnosis Date    Chronic kidney disease, stage 3 (HCC)     with secondary hyperparathyroidism of renal origin    Diabetes mellitus (HCC)     Hyperlipidemia     Hypertension     Rheumatoid arthritis (HCC)    [2] No past surgical history on file.  [3] No family history on file.  [4]   Allergies  Allergen Reactions    Sulfamethoxazole-Trimethoprim Hives    Penicillins Rash   [5]   Current Outpatient Medications:     Accu-Chek Lois Plus test strip, , Disp: , Rfl:     Accu-Chek FastClix Lancets MISC, , Disp: , Rfl:     atorvastatin (LIPITOR) 20 mg tablet, Take 1 tablet (20 mg total) by mouth daily, Disp: 90 tablet, Rfl: 1    Cholecalciferol (VITAMIN D3) 50 MCG (2000 UT) TABS, 1 capsule in the morning, Disp: , Rfl:     Eliquis 2.5 MG, 2.5 mg in the morning and 2.5 mg in the evening., Disp: , Rfl:     furosemide (LASIX) 20 mg tablet, Take 1 tablet (20 mg total) by mouth daily, Disp: 90 tablet, Rfl: 0    glipiZIDE (GLUCOTROL XL) 2.5 mg 24 hr tablet, Take 2.5 mg by mouth in the morning., Disp: , Rfl:     hydroxychloroquine (PLAQUENIL) 200 mg tablet, Take 200 mg by mouth daily with breakfast, Disp: , Rfl:     Jardiance 10 MG TABS tablet, Take 1 tablet (10 mg total) by mouth in the morning., Disp: 90 tablet, Rfl: 1    menthol-zinc oxide (Calmoseptine) 0.44-20.6 % OINT, Apply topically 2 (two) times a day, Disp: 71 g, Rfl: 0     metoprolol tartrate (LOPRESSOR) 25 mg tablet, Take 12.5 mg by mouth every 12 (twelve) hours, Disp: , Rfl:     mirtazapine (REMERON) 30 mg tablet, Take 30 mg by mouth daily at bedtime as needed, Disp: , Rfl:     Multiple Vitamins-Minerals (CENTRUM SILVER 50+MEN PO), , Disp: , Rfl:     tamsulosin (FLOMAX) 0.4 mg, Take 1 capsule (0.4 mg total) by mouth daily with dinner, Disp: 90 capsule, Rfl: 1

## 2025-06-13 ENCOUNTER — TELEPHONE (OUTPATIENT)
Age: 89
End: 2025-06-13

## 2025-06-13 DIAGNOSIS — M70.21 OLECRANON BURSITIS OF RIGHT ELBOW: ICD-10-CM

## 2025-06-13 RX ORDER — CEPHALEXIN 500 MG/1
500 CAPSULE ORAL EVERY 6 HOURS SCHEDULED
Qty: 12 CAPSULE | Refills: 0 | Status: SHIPPED | OUTPATIENT
Start: 2025-06-13 | End: 2025-06-16

## 2025-06-13 NOTE — TELEPHONE ENCOUNTER
Caller: [facility]     Doctor: Dr. Torres     Reason for call: Please resubmit script for Keflex to [facility].     [phone]    [address]